# Patient Record
Sex: FEMALE | Race: OTHER
[De-identification: names, ages, dates, MRNs, and addresses within clinical notes are randomized per-mention and may not be internally consistent; named-entity substitution may affect disease eponyms.]

---

## 2017-03-20 ENCOUNTER — HOSPITAL ENCOUNTER (OUTPATIENT)
Dept: HOSPITAL 8 - STAR | Age: 47
Discharge: HOME | End: 2017-03-20
Attending: OTOLARYNGOLOGY
Payer: COMMERCIAL

## 2017-03-20 DIAGNOSIS — Z02.9: Primary | ICD-10-CM

## 2017-03-28 ENCOUNTER — HOSPITAL ENCOUNTER (OUTPATIENT)
Dept: HOSPITAL 8 - OUT | Age: 47
Discharge: HOME | End: 2017-03-28
Attending: OTOLARYNGOLOGY
Payer: COMMERCIAL

## 2017-03-28 VITALS — HEIGHT: 63 IN | WEIGHT: 156.53 LBS | BODY MASS INDEX: 27.73 KG/M2

## 2017-03-28 VITALS — DIASTOLIC BLOOD PRESSURE: 79 MMHG | SYSTOLIC BLOOD PRESSURE: 123 MMHG

## 2017-03-28 DIAGNOSIS — J34.2: Primary | ICD-10-CM

## 2017-03-28 DIAGNOSIS — J34.3: ICD-10-CM

## 2017-03-28 DIAGNOSIS — Z90.49: ICD-10-CM

## 2017-03-28 LAB
HCG UR LOT: (no result)
HCG UR OBC: (no result)

## 2017-03-28 PROCEDURE — 30520 REPAIR OF NASAL SEPTUM: CPT

## 2017-03-28 PROCEDURE — 81025 URINE PREGNANCY TEST: CPT

## 2017-03-28 PROCEDURE — 30140 RESECT INFERIOR TURBINATE: CPT

## 2017-07-18 ENCOUNTER — OFFICE VISIT (OUTPATIENT)
Dept: INTERNAL MEDICINE | Facility: MEDICAL CENTER | Age: 47
End: 2017-07-18
Payer: COMMERCIAL

## 2017-07-18 VITALS
HEIGHT: 62 IN | BODY MASS INDEX: 30 KG/M2 | HEART RATE: 65 BPM | WEIGHT: 163 LBS | SYSTOLIC BLOOD PRESSURE: 126 MMHG | TEMPERATURE: 99 F | OXYGEN SATURATION: 95 % | DIASTOLIC BLOOD PRESSURE: 78 MMHG

## 2017-07-18 DIAGNOSIS — R42 DIZZINESS: ICD-10-CM

## 2017-07-18 DIAGNOSIS — Z00.00 HEALTH CARE MAINTENANCE: ICD-10-CM

## 2017-07-18 DIAGNOSIS — N95.9 PREMENOPAUSAL PATIENT: ICD-10-CM

## 2017-07-18 DIAGNOSIS — J34.2 DEVIATED SEPTUM: ICD-10-CM

## 2017-07-18 PROCEDURE — 99204 OFFICE O/P NEW MOD 45 MIN: CPT | Performed by: INTERNAL MEDICINE

## 2017-07-18 NOTE — MR AVS SNAPSHOT
"        Joy Staples   2017 4:00 PM   Office Visit   MRN: [id number]    Department:  [facility] - Internal Med   Dept Phone:  [phone]    Description:  Female : 1970   Provider:  Fer Scott M.D.           Reason for Visit     Establish Care     Dizziness x    Eye Problem Foggingness    Extremity Weakness     Leg Pain Left. Sitffness    Lightheadedness     Cyst Brain 2005-Had surgery at [facility]    Nausea     Weight Gain Since got Gallbladder tooken out      Allergies as of 2017     No Known Allergies      You were diagnosed with     Health care maintenance   [509131]       Deviated septum   [258371]       Dizziness   [614540]       Premenopausal patient   [050439]         Vital Signs     Blood Pressure Pulse Temperature Height Weight Body Mass Index    126/78 mmHg 65 37.2 °C (99 °F) 1.575 m (5' 2\") 73.936 kg (163 lb) 29.81 kg/m2    Oxygen Saturation Smoking Status                95% Never Smoker           Basic Information     Date Of Birth Sex Race Ethnicity Preferred Language    1970 Female  or   Origin (Tajik,South Korean,Sammarinese,Omani, etc) English      Your appointments     2018  2:00 PM   Established Patient with Ciarra Montanez M.D.   [facility] - Internal Medicine (--)    [address] [place] [id number] [phone]           You will be receiving a confirmation call a few days before your appointment from our automated call confirmation system.              Problem List              ICD-10-CM Priority Class Noted - Resolved    Diarrhea R19.7   2016 - Present    Irregular menses N92.6   2016 - Present    Deviated septum J34.2   2016 - Present    BPPV (benign paroxysmal positional vertigo) H81.10   2016 - Present    Health care maintenance Z00.00   2016 - Present      Health Maintenance        Date Due Completion Dates    IMM DTaP/Tdap/Td Vaccine (1 - Tdap) 3/22/1990 ---    PAP " SMEAR 3/22/1992 ---    MAMMOGRAM 3/22/2011 ---    IMM INFLUENZA (1) 9/1/2017 ---            Current Immunizations     No immunizations on file.      Below and/or attached are the medications your provider expects you to take. Review all of your home medications and newly ordered medications with your provider and/or pharmacist. Follow medication instructions as directed by your provider and/or pharmacist. Please keep your medication list with you and share with your provider. Update the information when medications are discontinued, doses are changed, or new medications (including over-the-counter products) are added; and carry medication information at all times in the event of emergency situations     Allergies:  No Known Allergies          Medications  Valid as of: July 18, 2017 -  4:59 PM    Generic Name Brand Name Tablet Size Instructions for use    Ascorbic Acid (Tab) ascorbic acid 500 MG Take 500 mg by mouth every day.        Cholecalciferol (Cap) Vitamin D 2000 UNITS Take  by mouth.        Cyanocobalamin (Cap) B-12 1000 MCG Take  by mouth.        Glucosamine Sulfate (Cap) glucosamine Sulfate 500 MG Take 500 mg by mouth 3 times a day, with meals.        Meclizine HCl (Tab) ANTIVERT 25 MG Take 1 Tab by mouth 3 times a day as needed for Dizziness or Vertigo.        .                 Medicines prescribed today were sent to:     [facility] [address] [place], [place] [address] [address] [place], [place] [address] [address] [place] [id number]    Phone: [phone] Fax: [phone]    Open 24 Hours?: No      Medication refill instructions:       If your prescription bottle indicates you have medication refills left, it is not necessary to call your provider’s office. Please contact your pharmacy and they will refill your medication.    If your prescription bottle indicates you do not have any refills left, you may request refills at any time through one of the following ways: The online Directa Plus system (except Urgent Care), by calling your  provider’s office, or by asking your pharmacy to contact your provider’s office with a refill request. Medication refills are processed only during regular business hours and may not be available until the next business day. Your provider may request additional information or to have a follow-up visit with you prior to refilling your medication.   *Please Note: Medication refills are assigned a new Rx number when refilled electronically. Your pharmacy may indicate that no refills were authorized even though a new prescription for the same medication is available at the pharmacy. Please request the medicine by name with the pharmacy before contacting your provider for a refill.        Your To Do List     Future Labs/Procedures Complete By Expires    HEMOGLOBIN A1C  As directed 7/18/2018    TSH  As directed 7/18/2018    VITAMIN D,25 HYDROXY  As directed 7/18/2018         Fi.tt Access Code: [email or web address]  Expires: 8/17/2017  4:59 PM    Fi.tt  A secure, online tool to manage your health information     [employer]’s Fi.tt® is a secure, online tool that connects you to your personalized health information from the privacy of your home -- day or night - making it very easy for you to manage your healthcare. Once the activation process is completed, you can even access your medical information using the Fi.tt franco, which is available for free in the Apple Franco store or Google Play store.     Fi.tt provides the following levels of access (as shown below):   My Chart Features   [facility] Doctor [facility]  Urgent  Care [facility]  Primary Care  Doctor   Email your healthcare team securely and privately 24/7 X X X    Manage appointments: schedule your next appointment; view details of past/upcoming appointments X      Request prescription refills. X      View recent personal medical records, including lab and immunizations X X X X   View health record, including health history,  allergies, medications X X X X   Read reports about your outpatient visits, procedures, consult and ER notes X X X X   See your discharge summary, which is a recap of your hospital and/or ER visit that includes your diagnosis, lab results, and care plan. X X       How to register for [employer]:  1. Go to  [email or web address].  2. Click on the Sign Up Now box, which takes you to the New Member Sign Up page. You will need to provide the following information:  a. Enter your [employer] Access Code exactly as it appears at the top of this page. (You will not need to use this code after you’ve completed the sign-up process. If you do not sign up before the expiration date, you must request a new code.)   b. Enter your date of birth.   c. Enter your home email address.   d. Click Submit, and follow the next screen’s instructions.  3. Create a [employer] ID. This will be your [employer] login ID and cannot be changed, so think of one that is secure and easy to remember.  4. Create a [employer] password. You can change your password at any time.  5. Enter your Password Reset Question and Answer. This can be used at a later time if you forget your password.   6. Enter your e-mail address. This allows you to receive e-mail notifications when new information is available in [employer].  7. Click Sign Up. You can now view your health information.    For assistance activating your [employer] account, call [phone]

## 2017-07-18 NOTE — PROGRESS NOTES
Joy Staples is a 47 y.o. female who is here to Establish care and is new to the clinic.     CC: Dizziness    HPI:    Dizziness: She says when she is washing dishes or cooking for her family, she feels dissociated from the rest of her body. She doesn't have vertigo but it is more off balance. No tinnitus, fever, chills, sinu infections, allergies. She also feels like she doesn't have any energy. Has been ongoing for years. Since . She has seen a ENT and she had a deviated septum. She still feels dizzy. She denied any syncopal episodes. It is not associated with movement or any changes in position. She has seen a Neurosurgeon and was told it is not associated with the surgery. She also had multiple MRIs. She did have Vestibular rehab once but her insurance didn't cover it. She has been seen by a Neurologist, Neurosurgeon, ENT, and underwent some sort of PT as well which she says was not helpful and there was a physician which mentioned that patient may have a component of anxiety. She doesn't feel she has depression, doesn't complain of feeling down or losing interest in activities she enjoys. Also complains of foggy vision. She was reading online about multiple sclerosis and says her symptoms fit with it, but she has had MRI done in the past and has been evaluated by Neurology.       She also had Body sculpting done on Tuesday so she is complaining of abdominal pain. She is premenopausal so is complaining of hot flashes but otherwise no other symptoms.      Mammogram done earlier this year and it was normal.  PAP smear was done this past year and it was normal.     Other past medical history include: None  Past Surgical hx: 2 c-sections, cholecystctomy, colloid cyst in , deviated septum repair.  Fam Hx: One brother  from DM and the other from brain cancer at the age of 38.   Social Hx: Never smoker, 1-2 drinks every weekend, denied any other drug use.  with 2 kids. Works for a group  of [employer] (), .         No problem-specific assessment & plan notes found for this encounter.      She  has a past medical history of Colloid cyst of brain (CMS-HCC).    Patient Active Problem List    Diagnosis Date Noted   • Diarrhea 07/29/2016   • Irregular menses 07/29/2016   • Deviated septum 07/29/2016   • BPPV (benign paroxysmal positional vertigo) 07/29/2016   • Health care maintenance 07/29/2016       Allergies:Review of patient's allergies indicates no known allergies.    Current Outpatient Prescriptions   Medication Sig Dispense Refill   • glucosamine Sulfate 500 MG Cap Take 500 mg by mouth 3 times a day, with meals.     • Cyanocobalamin (B-12) 1000 MCG Cap Take  by mouth.     • Cholecalciferol (VITAMIN D) 2000 UNITS Cap Take  by mouth.     • ascorbic acid (ASCORBIC ACID) 500 MG Tab Take 500 mg by mouth every day.     • meclizine (ANTIVERT) 25 MG Tab Take 1 Tab by mouth 3 times a day as needed for Dizziness or Vertigo. 30 Tab 0     No current facility-administered medications for this visit.       Social History   Substance Use Topics   • Smoking status: Never Smoker    • Smokeless tobacco: Never Used   • Alcohol Use: 0.6 oz/week     1 Shots of liquor per week       Family History   Problem Relation Age of Onset   • Heart Disease Father    • Diabetes Brother        Review of Systems:   Constitutional ROS: No unexpected change in weight, No weakness, No unexplained fevers, sweats, or chills, Positive for fatigue.  Eye ROS: No recent significant change in vision, No eye pain, redness, discharge  Ear ROS: No ear pain, No drainage, No tinnitus or vertigo, No recent change in hearing  Mouth/Throat ROS: No sore throat  Neck ROS: No lumps or masses  Pulmonary ROS: No dyspnea on exertion, No wheezing, No shortness of breath, No recent change in breathing  Cardiovascular ROS: No exercise intolerance, No chest pain, No palpitations  Gastrointestinal ROS: No abdominal pain, No  "change in bowel habits, No significant change in appetite, No nausea, vomiting, diarrhea, or constipation  Musculoskeletal/Extremities ROS: No peripheral edema  Hematologic/Lymphatic ROS: No weight loss  Skin/Integumentary ROS: color, texture and temperature normal  Neurologic ROS: Positive for having a cyst in the brain s/p craniotomy.   Psychiatric ROS: No depression, No anxiety    Physical Exam:  Blood pressure 126/78, pulse 65, temperature 37.2 °C (99 °F), height 1.575 m (5' 2\"), weight 73.936 kg (163 lb), SpO2 95 %. Body mass index is 29.81 kg/(m^2).    General Appearance: healthy, alert, no distress, cooperative  Skin: Skin color, texture, turgor normal. No rashes or lesions.  Head: Normocephalic. No masses appreciated.   Eyes: conjunctivae/corneas clear. PERRLA.  Ears: External ears normal.  Nose/Sinuses: Nares normal.  Oropharynx: Lips, mucosa, and tongue normal.   Neck: Neck supple. No adenopathy. Thyroid symmetric, normal size, and without nodularity.  Lungs: Percussion normal. Lungs clear to auscultation bilaterally.  Heart: RRR without murmur, gallop, or rubs.  Abdomen: Soft, non-tender. BS normal.  Musculoskeletal: Extremities: Upper and lower extremities appear normal. No deformities, edema.   Peripheral Pulses: Pulses: radial=4/4  Neurologic: Cranial nerves intact.   Psychiatric: Mood appears normal.     Assessment/Plan:     Problem List Items Addressed This Visit     Deviated septum    [facility] care maintenance    Relevant Orders    VITAMIN D,25 HYDROXY    HEMOGLOBIN A1C    TSH      Other Visit Diagnoses     Dizziness         Premenopausal patient             For the dizziness, patient has gone extensive workup. May consider an SSRI for this patient as she does have fatigue and some symptoms which may point towards ABI. Can re-assess patient but for now, she doesn't want to pursue anything further and has been a problem ongoing since 2005.  Recommended to talk to Ob/Gyn regarding transdermal Estrogen " as the Estroven is not working for her.       Followup: No Follow-up on file.

## 2017-08-04 LAB
25(OH)D3+25(OH)D2 SERPL-MCNC: 41.1 NG/ML (ref 30–100)
HBA1C MFR BLD: 5.6 % (ref 4.8–5.6)
TSH SERPL DL<=0.005 MIU/L-ACNC: 1.24 UIU/ML (ref 0.45–4.5)

## 2017-09-21 ENCOUNTER — OFFICE VISIT (OUTPATIENT)
Dept: INTERNAL MEDICINE | Facility: MEDICAL CENTER | Age: 47
End: 2017-09-21
Payer: COMMERCIAL

## 2017-09-21 VITALS
HEIGHT: 62 IN | DIASTOLIC BLOOD PRESSURE: 55 MMHG | WEIGHT: 160 LBS | SYSTOLIC BLOOD PRESSURE: 87 MMHG | TEMPERATURE: 99.6 F | HEART RATE: 68 BPM | BODY MASS INDEX: 29.44 KG/M2 | OXYGEN SATURATION: 96 %

## 2017-09-21 DIAGNOSIS — F41.1 GENERALIZED ANXIETY DISORDER: ICD-10-CM

## 2017-09-21 DIAGNOSIS — H81.10 BPPV (BENIGN PAROXYSMAL POSITIONAL VERTIGO), UNSPECIFIED LATERALITY: ICD-10-CM

## 2017-09-21 DIAGNOSIS — Z00.00 HEALTH CARE MAINTENANCE: ICD-10-CM

## 2017-09-21 PROCEDURE — 99214 OFFICE O/P EST MOD 30 MIN: CPT | Performed by: INTERNAL MEDICINE

## 2017-09-21 RX ORDER — MECLIZINE HYDROCHLORIDE 25 MG/1
25 TABLET ORAL 2 TIMES DAILY PRN
Qty: 30 TAB | Refills: 0 | Status: SHIPPED | OUTPATIENT
Start: 2017-09-21 | End: 2018-06-26 | Stop reason: SDUPTHER

## 2017-09-21 RX ORDER — SERTRALINE HYDROCHLORIDE 25 MG/1
25 TABLET, FILM COATED ORAL DAILY
Qty: 30 TAB | Refills: 3 | Status: SHIPPED | OUTPATIENT
Start: 2017-09-21 | End: 2018-06-26

## 2017-09-21 ASSESSMENT — PATIENT HEALTH QUESTIONNAIRE - PHQ9
SUM OF ALL RESPONSES TO PHQ QUESTIONS 1-9: 11
CLINICAL INTERPRETATION OF PHQ2 SCORE: 3
5. POOR APPETITE OR OVEREATING: 0 - NOT AT ALL

## 2017-09-21 NOTE — PROGRESS NOTES
"        Joy Staples is a 47 y.o. female who is here for routine follow up.    CC: Dizziness, fatigue, headaches    HPI:    Joy comes to the clinic today with complaints of continued dizziness, headaches, and fatigue. She also reports increased anxiety. She reports continues worrying, trouble relaxing, being restless, and thinking that something bad might happen. She says this happens on a daily basis and she is worried something is \"really wrong with my body.\" These thoughts and perceptions have made it somewhat difficult for her to function.    ABI 7 done today showed a score of 18. With the other symptoms and ABI 7, advised patient that it may be reasonable to start a medication for the Anxiety and this has been ongoing for years.     In regards to her dizziness, she had a MRI done back in 2009 and says she had one in between as well at [facility]. She has a history of having a colloid cyst in the left frontal region. This was resected and she had follow up imaging for that which showed postop changes with surrounding gliosis in the left posterior-superior frontal lobe. There were no other changes noted at that time.    Patient declined the influenza vaccine.     No problem-specific Assessment & Plan notes found for this encounter.      She  has a past medical history of Colloid cyst of brain (CMS-HCC).    Patient Active Problem List    Diagnosis Date Noted   • Diarrhea 07/29/2016   • Irregular menses 07/29/2016   • Deviated septum 07/29/2016   • BPPV (benign paroxysmal positional vertigo) 07/29/2016   • Health care maintenance 07/29/2016       Allergies:Review of patient's allergies indicates no known allergies.    Current Outpatient Prescriptions   Medication Sig Dispense Refill   • meclizine (ANTIVERT) 25 MG Tab Take 1 Tab by mouth 2 times a day as needed for Dizziness or Vertigo. 30 Tab 0   • sertraline (ZOLOFT) 25 MG tablet Take 1 Tab by mouth every day. 30 Tab 3   • glucosamine Sulfate 500 MG Cap " "Take 500 mg by mouth 3 times a day, with meals.     • Cyanocobalamin (B-12) 1000 MCG Cap Take  by mouth.     • Cholecalciferol (VITAMIN D) 2000 UNITS Cap Take  by mouth.     • ascorbic acid (ASCORBIC ACID) 500 MG Tab Take 500 mg by mouth every day.       No current facility-administered medications for this visit.        Social History   Substance Use Topics   • Smoking status: Never Smoker   • Smokeless tobacco: Never Used   • Alcohol use 0.6 oz/week     1 Shots of liquor per week       Family History   Problem Relation Age of Onset   • Heart Disease Father    • Diabetes Brother        Review of Systems:     Pertinent positives as stated in HPI, all others reviewed as negative.    Physical Exam:  Blood pressure (!) 87/55, pulse 68, temperature 37.6 °C (99.6 °F), height 1.575 m (5' 2\"), weight 72.6 kg (160 lb), SpO2 96 %. Body mass index is 29.26 kg/m².    General Appearance: healthy, alert, no distress, cooperative  Skin: Skin color, texture, turgor normal. No rashes or lesions.  Head: Normocephalic. No masses appreciated.   Lungs: Percussion normal. Lungs clear to auscultation bilaterally.  Heart: RRR without murmur, gallop, or rubs.  Abdomen: Soft, non-tender. BS normal.   Musculoskeletal: Extremities: Upper and lower extremities appear normal. No deformities, edema.   Peripheral Pulses: Pulses: radial=4/4, dorsalis pedis=4/4  Psychiatric: Mood appears somewhat depressed.     Assessment/Plan:     1. BPPV (benign paroxysmal positional vertigo), unspecified laterality  - Unclear etiology. Previous symptoms indicative of BPPV. Says she has not tried Meclizine.  - Will review imaging from [place]. Given a script for Meclizine.   - meclizine (ANTIVERT) 25 MG Tab; Take 1 Tab by mouth 2 times a day as needed for Dizziness or Vertigo.  Dispense: 30 Tab; Refill: 0    2. Generalized anxiety disorder  - ABI 7 of 18.  - Will start patient on Zoloft. See how patient tolerates the medication. May increase dose as well if " not working well.   - sertraline (ZOLOFT) 25 MG tablet; Take 1 Tab by mouth every day.  Dispense: 30 Tab; Refill: 3    3. Health care maintenance  - Declined flu vaccine.  - Mammogram up to date.       Followup: Return in about 4 weeks (around 10/19/2017), or if symptoms worsen or fail to improve.

## 2017-12-11 ENCOUNTER — OFFICE VISIT (OUTPATIENT)
Dept: INTERNAL MEDICINE | Facility: MEDICAL CENTER | Age: 47
End: 2017-12-11
Payer: COMMERCIAL

## 2017-12-11 VITALS
BODY MASS INDEX: 30 KG/M2 | SYSTOLIC BLOOD PRESSURE: 103 MMHG | TEMPERATURE: 98.8 F | HEIGHT: 62 IN | WEIGHT: 163 LBS | DIASTOLIC BLOOD PRESSURE: 63 MMHG | HEART RATE: 64 BPM | OXYGEN SATURATION: 90 %

## 2017-12-11 DIAGNOSIS — F41.9 ANXIETY: ICD-10-CM

## 2017-12-11 DIAGNOSIS — K59.00 CONSTIPATION, UNSPECIFIED CONSTIPATION TYPE: ICD-10-CM

## 2017-12-11 DIAGNOSIS — H53.8 BLURRED VISION: ICD-10-CM

## 2017-12-11 DIAGNOSIS — H81.10 BENIGN PAROXYSMAL POSITIONAL VERTIGO, UNSPECIFIED LATERALITY: ICD-10-CM

## 2017-12-11 PROCEDURE — 99213 OFFICE O/P EST LOW 20 MIN: CPT | Mod: GE | Performed by: INTERNAL MEDICINE

## 2017-12-12 PROBLEM — K59.00 CONSTIPATION: Status: ACTIVE | Noted: 2017-12-12

## 2017-12-12 PROBLEM — F41.9 ANXIETY: Status: ACTIVE | Noted: 2017-12-12

## 2017-12-12 ASSESSMENT — ENCOUNTER SYMPTOMS
BLURRED VISION: 1
STRIDOR: 0
DIARRHEA: 0
EYE DISCHARGE: 0
CHILLS: 0
FEVER: 0
NERVOUS/ANXIOUS: 1
SENSORY CHANGE: 1
SORE THROAT: 0
SHORTNESS OF BREATH: 0
EYE PAIN: 1
SPUTUM PRODUCTION: 0
CONSTIPATION: 1
ABDOMINAL PAIN: 0
FOCAL WEAKNESS: 1
COUGH: 0
DOUBLE VISION: 0
PALPITATIONS: 0

## 2017-12-12 NOTE — PROGRESS NOTES
"      Established Patient    Joy presents today with the following:    CC: Blurred vision, Dizziness and constipation    HPI:     Blurred vision  Constipation  Dizziness  Anxiety   Patient reports that she has had blurred vision for 2 months.  She states things appear foggy.   She also noticed that colorful dots in her visual fields.  She states that blurred vision doesn't affect her driving.  She also c/o eye pain described as someone is \"pinching\" her.  She also experiences this pain in her whole body.  Pain is intermittent.  No correlation with time.  Her blurred vision is constant.   She denies any double vision, eye discharge or redness.  She believes she might have MS because she has blurred vision and constipation.    She also reports that she hasn't been drinking enough water.  She reports right leg numbness constant for 2-3 years.  Denies any intermittent sensory changes with varying location and time.    She reports b/l UE weakness since the resection of her colloidal cyst in 2008.    Though, her muscle strength is 5/5 in both UE and LE.   She feels dizzy sometimes.  She was diagnosed with BPPV and referred to Vestibular therapy.  She felt it helped; However, she is unable to continue as her insurance didn't cover.   Last time, she was started on SSRI for anxiety.  She stopped taking her SSRI some period of time and she developed anxiety attack while driving.  She started taking her SSRI again and felt it is helping with her symptoms.  She states she gets anxiety about her past diagnosis of colloidal cyst in Brain which she underwent resection.  She fears that it is growing again.  Her follow up MRI in 2006, 2007 and 2009 showed no evidence of recurrent or residual cyst or neoplasm.  There is no objective neurological finding on exam.    She also states she gets very anxious waiting in the room while discussing with my preceptor.  Discussed about seeing counselor.  She politely declined.  She states she " "will try yoga.            Patient Active Problem List    Diagnosis Date Noted   • Diarrhea 07/29/2016   • Irregular menses 07/29/2016   • Deviated septum 07/29/2016   • BPPV (benign paroxysmal positional vertigo) 07/29/2016   • Health care maintenance 07/29/2016       Current Outpatient Prescriptions   Medication Sig Dispense Refill   • meclizine (ANTIVERT) 25 MG Tab Take 1 Tab by mouth 2 times a day as needed for Dizziness or Vertigo. 30 Tab 0   • sertraline (ZOLOFT) 25 MG tablet Take 1 Tab by mouth every day. 30 Tab 3   • glucosamine Sulfate 500 MG Cap Take 500 mg by mouth 3 times a day, with meals.     • Cyanocobalamin (B-12) 1000 MCG Cap Take  by mouth.     • Cholecalciferol (VITAMIN D) 2000 UNITS Cap Take  by mouth.     • ascorbic acid (ASCORBIC ACID) 500 MG Tab Take 500 mg by mouth every day.       No current facility-administered medications for this visit.        ROS: As per HPI. Additional pertinent symptoms as noted below.      Review of Systems   Constitutional: Negative for chills and fever.   HENT: Negative for sore throat.    Eyes: Positive for blurred vision and pain. Negative for double vision and discharge.   Respiratory: Negative for cough, sputum production, shortness of breath and stridor.    Cardiovascular: Negative for chest pain and palpitations.   Gastrointestinal: Positive for constipation. Negative for abdominal pain and diarrhea.   Genitourinary: Negative for dysuria and urgency.   Musculoskeletal: Negative for joint pain.        Whole body pain described as \"pinching pain\" which is intermittent.    Skin: Negative for itching and rash.   Neurological: Positive for sensory change and focal weakness.   Psychiatric/Behavioral: The patient is nervous/anxious.        /63   Pulse 64   Temp 37.1 °C (98.8 °F)   Ht 1.575 m (5' 2\")   Wt 73.9 kg (163 lb)   SpO2 90%   BMI 29.81 kg/m²     Physical Exam   Constitutional:  oriented to person, place, and time. No distress.   Eyes: Pupils are " equal, round, and reactive to light.  EOMI.  Conjunctiva non erythematous.  No discharge.  No nystagmus. No scleral icterus.  No peripheral visual field deficit.    Neck: Neck supple..   Cardiovascular: Normal rate, regular rhythm and normal heart sounds.  Exam reveals no gallop and no friction rub.  No murmur heard.  Pulmonary/Chest: Breath sounds normal. Chest wall is not dull to percussion.   Musculoskeletal:   UE and LE strength: 5/5 equal b/l.   Sensation intact.   Abdomen:  Soft, BS+, Non tender, non distended  Neurological: alert and oriented to person, place, and time.  Gait normal.  Negative Romberg test.  No focal neurological deficit.  CN 3-12 grossly intact  Skin: No cyanosis. Nails show no clubbing.      Assessment and Plan    1. Benign paroxysmal positional vertigo, unspecified laterality  - BPPV exercise handout was given  - Meclizine prn     2. Blurred vision  - No sign of orbital cellulitis  - Refer to ophthalmology for dilated eye exam and further evaluation.   - No sign of Myasthenia gravis as there is no worsening blurred vision at the end of day or double vision.   - REFERRAL TO OPHTHALMOLOGY    3. Anxiety  - Anxious and fears that Brain colloidal cyst is growing.   Fears that she has MS.   - Her follow up MRI after resection  in 2006, 2007 and 2009 showed no evidence of recurrent or residual cyst or neoplasm.  - - There is no objective neurological finding on exam that warrants MRI at this time.   - Although she fears about MS and c/o constant RLE nubmness for 2-3 yrs, there is no neurological deficit that disseminated in time and space   - Will follow closely.  If she develops any sign of neurological deficit, will do work up  - Patient would benefit from seeing her neurologist.   - Meantime c/w SSRI.  Declined to see counselor for anxiety  - Patient would like to try yoga and relaxation method  - Advised about Mindful meditation.     4. Constipation  - Increase fluid intake, vegetable and  fruit in her diet  - May try prune juice.  If no relief, try OTC Miralax   - CTM       Followup: Return in about 6 months (around 6/11/2018).      Signed by: Ciarra Montanez M.D.

## 2018-06-26 ENCOUNTER — OFFICE VISIT (OUTPATIENT)
Dept: INTERNAL MEDICINE | Facility: MEDICAL CENTER | Age: 48
End: 2018-06-26
Payer: COMMERCIAL

## 2018-06-26 VITALS
HEART RATE: 65 BPM | SYSTOLIC BLOOD PRESSURE: 126 MMHG | OXYGEN SATURATION: 91 % | HEIGHT: 62 IN | BODY MASS INDEX: 30.69 KG/M2 | WEIGHT: 166.8 LBS | TEMPERATURE: 99.5 F | DIASTOLIC BLOOD PRESSURE: 70 MMHG

## 2018-06-26 DIAGNOSIS — H81.10 BPPV (BENIGN PAROXYSMAL POSITIONAL VERTIGO), UNSPECIFIED LATERALITY: ICD-10-CM

## 2018-06-26 DIAGNOSIS — E66.9 OBESITY (BMI 30-39.9): ICD-10-CM

## 2018-06-26 DIAGNOSIS — H53.8 BLURRY VISION: Primary | ICD-10-CM

## 2018-06-26 DIAGNOSIS — F41.9 ANXIETY: ICD-10-CM

## 2018-06-26 PROCEDURE — 99214 OFFICE O/P EST MOD 30 MIN: CPT | Mod: GC | Performed by: INTERNAL MEDICINE

## 2018-06-26 RX ORDER — MECLIZINE HYDROCHLORIDE 25 MG/1
25 TABLET ORAL 2 TIMES DAILY PRN
Qty: 30 TAB | Refills: 1 | Status: SHIPPED | OUTPATIENT
Start: 2018-06-26 | End: 2018-12-17

## 2018-06-26 ASSESSMENT — ENCOUNTER SYMPTOMS
PHOTOPHOBIA: 0
TREMORS: 0
SHORTNESS OF BREATH: 0
DIZZINESS: 1
FEVER: 0
COUGH: 0
WHEEZING: 0
TINGLING: 0
DEPRESSION: 0
VOMITING: 0
ABDOMINAL PAIN: 0
CHILLS: 0
WEAKNESS: 0
DIARRHEA: 0
NAUSEA: 0
MYALGIAS: 0
SORE THROAT: 0
FALLS: 0
DOUBLE VISION: 0
PALPITATIONS: 0
CONSTIPATION: 0
SEIZURES: 0
BLURRED VISION: 1
FOCAL WEAKNESS: 0
HEADACHES: 0

## 2018-06-26 ASSESSMENT — LIFESTYLE VARIABLES: SUBSTANCE_ABUSE: 0

## 2018-06-26 NOTE — PROGRESS NOTES
Established Patient    Joy presents today with the following:    CC:   Blurry vision      HPI:   48-year-old female with a past medical history of anxiety, vertigo, colloid cyst status post resection in 2005 who presents for a follow-up for her blurry vision. Patient has noted blurry vision and dizziness since her cyst resection. Patient has had multiple MRIs since her resection which have shown no acute process. Patient states that she has had blurry vision and feelings that stationary objects are moving when she stares at them for an extended period of time. She states that these symptoms are progressively increasing in frequency over the last 4-6 months. She also states that when she stares at patterns for extended periods of time, they start to move. She denies any associated headaches, double vision, muscle weakness. Patient states that these symptoms are not associated with any particular time of day. She does note intermittent paresthesia of her right leg which has been present for many years. Patient has seen a neurologist previously but has not followed up recently (greater than 3-4 years). Patient was recently seen by ophthalmologist in May 2017 who noted no acute abnormalities, as per the patient. She also notes occasional episodes of increased anxiety where she notices palpitations, elevated heart rate and tachypnea. These episodes resolve with rest and deep breathing. Patient was previously on Zoloft for anxiety but stopped taking it as she did not feel the medication helped.      Patient Active Problem List    Diagnosis Date Noted   • Obesity (BMI 30-39.9) 06/26/2018   • Anxiety 12/12/2017   • Constipation 12/12/2017   • Diarrhea 07/29/2016   • Irregular menses 07/29/2016   • Deviated septum 07/29/2016   • BPPV (benign paroxysmal positional vertigo) 07/29/2016   • Health care maintenance 07/29/2016       Current Outpatient Prescriptions   Medication Sig Dispense Refill   • meclizine (ANTIVERT) 25 MG  "Tab Take 1 Tab by mouth 2 times a day as needed for Dizziness or Vertigo. 30 Tab 1   • glucosamine Sulfate 500 MG Cap Take 500 mg by mouth 3 times a day, with meals.     • Cyanocobalamin (B-12) 1000 MCG Cap Take  by mouth.     • Cholecalciferol (VITAMIN D) 2000 UNITS Cap Take  by mouth.     • ascorbic acid (ASCORBIC ACID) 500 MG Tab Take 500 mg by mouth every day.       No current facility-administered medications for this visit.          ROS: As per HPI. Additional pertinent symptoms as noted below.    Review of Systems   Constitutional: Negative for chills and fever.   HENT: Negative for congestion, hearing loss and sore throat.    Eyes: Positive for blurred vision. Negative for double vision and photophobia.   Respiratory: Negative for cough, shortness of breath and wheezing.    Cardiovascular: Negative for chest pain, palpitations and leg swelling.   Gastrointestinal: Negative for abdominal pain, constipation, diarrhea, nausea and vomiting.   Genitourinary: Negative for dysuria and urgency.   Musculoskeletal: Negative for falls and myalgias.   Skin: Negative for itching and rash.   Neurological: Positive for dizziness. Negative for tingling, tremors, focal weakness, seizures, weakness and headaches.   Psychiatric/Behavioral: Negative for depression and substance abuse.       Physical Exam    /70   Pulse 65   Temp 37.5 °C (99.5 °F)   Ht 1.575 m (5' 2\")   Wt 75.7 kg (166 lb 12.8 oz)   SpO2 91%   BMI 30.51 kg/m²     Physical Exam   Constitutional: She is oriented to person, place, and time and well-developed, well-nourished, and in no distress.   HENT:   Head: Normocephalic and atraumatic.   Mouth/Throat: No oropharyngeal exudate.   Eyes: Conjunctivae are normal. Pupils are equal, round, and reactive to light. No scleral icterus.   Neck: Normal range of motion. Neck supple. No JVD present. No thyromegaly present.   Cardiovascular: Normal rate, regular rhythm and normal heart sounds.    Pulmonary/Chest: " Effort normal and breath sounds normal. No respiratory distress. She has no wheezes.   Abdominal: Soft. Bowel sounds are normal. She exhibits no distension. There is no tenderness. There is no rebound.   Musculoskeletal: Normal range of motion. She exhibits no edema.   Neurological: She is alert and oriented to person, place, and time. No cranial nerve deficit. Coordination normal.   Slight horizontal nystagmus noted   Skin: No rash noted. No erythema.   Psychiatric: Affect normal.         Assessment and Plan    1. Blurry vision  - Patient notes chronic history of blurry vision, visual disturbances  - Status post colloid cyst resection in 2005, uncomplicated  - Exam shows no focal defects, normal sensation, no visual abnormalities noted  - Recently seen by ophthalmology in 05/2017, no acute abnormalities noted, records pending  - No clear etiology for blurry vision, low suspicion for stroke or intracranial mass due to chronicity of symptoms, possibly psychogenic in nature  - REFERRAL TO NEUROLOGY for further evaluation and possible imaging    2. BPPV (benign paroxysmal positional vertigo), unspecified laterality  - Patient notes occasional episodes of vertigo, slightly relieved with medication  - Continue home meclizine  - meclizine (ANTIVERT) 25 MG Tab; Take 1 Tab by mouth 2 times a day as needed for Dizziness or Vertigo.  Dispense: 30 Tab; Refill: 1      3. Anxiety  - Patient has a history of anxiety, occasional panic attacks  - Previously on Zoloft but stopped taking it for months, did not feel adequate effect  - Patient was previously offered to see psychiatrist/behavioral health, patient declined  - We'll continue to monitor for resolution of symptoms  - Encourage meditation, yoga, relaxation      4. Obesity (BMI 30-39.9)  - Patient noted to have a BMI of 30.5 in clinic today  - Gained some weight due to decreased exercise due to visual symptoms, affects movement  - Patient identified as having weight  management issue.  Appropriate orders and counseling given.      Follow up: Return in about 3 months (around 9/26/2018).    Signed by: Bladimir Vargas M.D.

## 2018-12-17 ENCOUNTER — OFFICE VISIT (OUTPATIENT)
Dept: INTERNAL MEDICINE | Facility: MEDICAL CENTER | Age: 48
End: 2018-12-17
Payer: COMMERCIAL

## 2018-12-17 VITALS
HEIGHT: 62 IN | SYSTOLIC BLOOD PRESSURE: 114 MMHG | TEMPERATURE: 98.1 F | DIASTOLIC BLOOD PRESSURE: 60 MMHG | HEART RATE: 70 BPM | OXYGEN SATURATION: 94 % | BODY MASS INDEX: 31.1 KG/M2 | WEIGHT: 169 LBS

## 2018-12-17 DIAGNOSIS — E66.9 OBESITY (BMI 30-39.9): ICD-10-CM

## 2018-12-17 DIAGNOSIS — H53.2 DIPLOPIA: ICD-10-CM

## 2018-12-17 DIAGNOSIS — H53.8 BLURRY VISION: ICD-10-CM

## 2018-12-17 PROCEDURE — 99213 OFFICE O/P EST LOW 20 MIN: CPT | Mod: GE | Performed by: INTERNAL MEDICINE

## 2018-12-17 RX ORDER — ESTRADIOL 0.05 MG/D
1 PATCH TRANSDERMAL
Refills: 8 | COMMUNITY
Start: 2018-11-22 | End: 2022-05-17

## 2018-12-17 ASSESSMENT — PAIN SCALES - GENERAL: PAINLEVEL: NO PAIN

## 2018-12-17 ASSESSMENT — PATIENT HEALTH QUESTIONNAIRE - PHQ9
CLINICAL INTERPRETATION OF PHQ2 SCORE: 3
SUM OF ALL RESPONSES TO PHQ QUESTIONS 1-9: 5
5. POOR APPETITE OR OVEREATING: 0 - NOT AT ALL

## 2018-12-17 NOTE — PROGRESS NOTES
Established Patient    Joy presents today with the following:    CC:  Blurry vision    HPI:   48-year-old female with a past medical history of anxiety, vertigo, colloid cyst status post resection in 2005 who presents for a follow-up for her blurry vision.  Patient continues to complain of similar symptoms since her last visit she states that she has had blurry vision and feels that stationary objects move or become 'wavy' when she looks at them for extended periods of time.  She states that this is progressively increased in severity over the last year.  Patient states the symptoms started approximately 2005 after cyst resection.  She has had 3 negative MRIs since 2005 with the last one in 2009. She denies any headaches, muscle weakness, paresthesias.  Patient was previously given a neurology referral which she has not followed up with.  Patient is also stopped her meclizine for vertigo she states that she feels it does not help.      Patient Active Problem List    Diagnosis Date Noted   • Blurry vision 12/17/2018   • Obesity (BMI 30-39.9) 06/26/2018   • Anxiety 12/12/2017   • Constipation 12/12/2017   • Diarrhea 07/29/2016   • Irregular menses 07/29/2016   • Deviated septum 07/29/2016   • BPPV (benign paroxysmal positional vertigo) 07/29/2016   • Health care maintenance 07/29/2016       Current Outpatient Prescriptions   Medication Sig Dispense Refill   • estradiol (CLIMARA) 0.05 MG/24HR PATCH WEEKLY APPLY 1 PATCH TOPICALLY ONCE A WEEK  8   • progesterone (PROMETRIUM) 100 MG Cap Take 100 mg by mouth.  3   • glucosamine Sulfate 500 MG Cap Take 500 mg by mouth 3 times a day, with meals.     • Cyanocobalamin (B-12) 1000 MCG Cap Take  by mouth.     • Cholecalciferol (VITAMIN D) 2000 UNITS Cap Take  by mouth.     • ascorbic acid (ASCORBIC ACID) 500 MG Tab Take 500 mg by mouth every day.       No current facility-administered medications for this visit.          ROS: As per HPI. Additional pertinent symptoms as noted  "below.  ROS   Review of Systems   Constitutional: Negative for chills and fever.   HENT: Negative for congestion, hearing loss and sore throat.    Eyes: Positive for blurred vision. Negative for double vision and photophobia.   Respiratory: Negative for cough, shortness of breath and wheezing.    Cardiovascular: Negative for chest pain, palpitations and leg swelling.   Gastrointestinal: Negative for abdominal pain, constipation, diarrhea, nausea and vomiting.   Genitourinary: Negative for dysuria and urgency.   Musculoskeletal: Negative for falls and myalgias.   Skin: Negative for itching and rash.   Neurological: Positive for dizziness. Negative for tingling, tremors, focal weakness, seizures, weakness and headaches.   Psychiatric/Behavioral: Negative for depression and substance abuse.        Physical Exam    /60 (BP Location: Right arm, Patient Position: Sitting, BP Cuff Size: Adult)   Pulse 70   Temp 36.7 °C (98.1 °F) (Temporal)   Ht 1.575 m (5' 2\")   Wt 76.7 kg (169 lb)   LMP 12/14/2018   SpO2 94%  Breastfeeding? No  BMI 30.91 kg/m²     Physical Exam   Physical Exam   Constitutional: She is oriented to person, place, and time and well-developed, well-nourished, and in no distress.   HENT:   Head: Normocephalic and atraumatic.   Mouth/Throat: No oropharyngeal exudate.   Eyes: Conjunctivae are normal. Pupils are equal, round, and reactive to light. No scleral icterus.   Neck: Normal range of motion. Neck supple. No JVD present. No thyromegaly present.   Cardiovascular: Normal rate, regular rhythm and normal heart sounds.    Pulmonary/Chest: Effort normal and breath sounds normal. No respiratory distress. She has no wheezes.   Abdominal: Soft. Bowel sounds are normal. She exhibits no distension. There is no tenderness. There is no rebound.   Musculoskeletal: Normal range of motion. She exhibits no edema.   Neurological: She is alert and oriented to person, place, and time. No cranial nerve deficit. " Coordination normal. No focal muscle weakness appreciated.   Slight horizontal nystagmus noted   Skin: No rash noted. No erythema.   Psychiatric: Affect normal.         Assessment and Plan    1. Blurry vision  -Patient complains of blurry vision, visual disturbances for many years, increasing in severity  -Status post: Cyst resection in 2005  -Recently seen by ophthalmology in 05/2017, no acute abnormalities noted  -No clear etiology for symptoms, possible myelination disorder however unlikely given negative imaging in the past, possibly related to procedure in past  - REFERRAL TO NEUROOPTHAMOLOGY for further evaluation and possible imaging, appreciate recommendations      2. Obesity (BMI 30-39.9)  - BMI of 30.9 in clinic today  - Gained some weight due to decreased exercise due to visual symptoms, affects movement  - Patient identified as having weight management issue.  Appropriate orders and counseling given.       Follow up: Return in about 6 months (around 6/17/2019).      Signed by: Bladimir Vargas M.D.

## 2019-01-03 DIAGNOSIS — H81.10 BENIGN PAROXYSMAL POSITIONAL VERTIGO, UNSPECIFIED LATERALITY: ICD-10-CM

## 2019-01-03 DIAGNOSIS — H53.8 BLURRY VISION: ICD-10-CM

## 2019-07-05 ENCOUNTER — HOSPITAL ENCOUNTER (OUTPATIENT)
Dept: HOSPITAL 8 - OR | Age: 49
Discharge: HOME | End: 2019-07-05
Attending: OBSTETRICS & GYNECOLOGY
Payer: COMMERCIAL

## 2019-07-05 VITALS — BODY MASS INDEX: 29.3 KG/M2 | WEIGHT: 165.35 LBS | HEIGHT: 63 IN

## 2019-07-05 VITALS — SYSTOLIC BLOOD PRESSURE: 117 MMHG | DIASTOLIC BLOOD PRESSURE: 77 MMHG

## 2019-07-05 DIAGNOSIS — N85.8: Primary | ICD-10-CM

## 2019-07-05 DIAGNOSIS — Z91.013: ICD-10-CM

## 2019-07-05 DIAGNOSIS — Z80.8: ICD-10-CM

## 2019-07-05 DIAGNOSIS — N99.71: ICD-10-CM

## 2019-07-05 DIAGNOSIS — Z98.890: ICD-10-CM
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
## 2019-07-05 DIAGNOSIS — Z79.899: ICD-10-CM

## 2019-07-05 DIAGNOSIS — Z83.3: ICD-10-CM

## 2019-07-05 DIAGNOSIS — Z90.49: ICD-10-CM

## 2019-07-05 LAB — HCG UR SG: 1.02 (ref 1–1.03)

## 2019-07-05 PROCEDURE — 88305 TISSUE EXAM BY PATHOLOGIST: CPT

## 2019-07-05 PROCEDURE — 81025 URINE PREGNANCY TEST: CPT

## 2019-07-05 PROCEDURE — 58558 HYSTEROSCOPY BIOPSY: CPT

## 2019-12-12 ENCOUNTER — HOSPITAL ENCOUNTER (OUTPATIENT)
Dept: HOSPITAL 8 - STAR | Age: 49
Discharge: HOME | End: 2019-12-12
Attending: OBSTETRICS & GYNECOLOGY
Payer: COMMERCIAL

## 2019-12-12 DIAGNOSIS — N95.0: ICD-10-CM

## 2019-12-12 DIAGNOSIS — R10.31: ICD-10-CM

## 2019-12-12 DIAGNOSIS — Z01.818: Primary | ICD-10-CM

## 2019-12-12 LAB
ALBUMIN SERPL-MCNC: 3.6 G/DL (ref 3.4–5)
ALP SERPL-CCNC: 76 U/L (ref 45–117)
ALT SERPL-CCNC: 26 U/L (ref 12–78)
ANION GAP SERPL CALC-SCNC: 5 MMOL/L (ref 5–15)
BILIRUB SERPL-MCNC: 0.8 MG/DL (ref 0.2–1)
CALCIUM SERPL-MCNC: 8.6 MG/DL (ref 8.5–10.1)
CHLORIDE SERPL-SCNC: 107 MMOL/L (ref 98–107)
CREAT SERPL-MCNC: 0.85 MG/DL (ref 0.55–1.02)
CULTURE INDICATED?: YES
MICROSCOPIC: (no result)
PROT SERPL-MCNC: 8 G/DL (ref 6.4–8.2)

## 2019-12-12 PROCEDURE — 36415 COLL VENOUS BLD VENIPUNCTURE: CPT

## 2019-12-12 PROCEDURE — 87086 URINE CULTURE/COLONY COUNT: CPT

## 2019-12-12 PROCEDURE — 81001 URINALYSIS AUTO W/SCOPE: CPT

## 2019-12-12 PROCEDURE — 93005 ELECTROCARDIOGRAM TRACING: CPT

## 2019-12-12 PROCEDURE — 80053 COMPREHEN METABOLIC PANEL: CPT

## 2019-12-12 PROCEDURE — 84702 CHORIONIC GONADOTROPIN TEST: CPT

## 2019-12-19 ENCOUNTER — HOSPITAL ENCOUNTER (OUTPATIENT)
Dept: HOSPITAL 8 - OUT | Age: 49
Discharge: HOME | End: 2019-12-19
Attending: OBSTETRICS & GYNECOLOGY
Payer: COMMERCIAL

## 2019-12-19 VITALS — WEIGHT: 156.53 LBS | HEIGHT: 63 IN | BODY MASS INDEX: 27.73 KG/M2

## 2019-12-19 VITALS — DIASTOLIC BLOOD PRESSURE: 68 MMHG | SYSTOLIC BLOOD PRESSURE: 109 MMHG

## 2019-12-19 DIAGNOSIS — N95.0: Primary | ICD-10-CM

## 2019-12-19 DIAGNOSIS — D25.9: ICD-10-CM

## 2019-12-19 DIAGNOSIS — Z90.49: ICD-10-CM

## 2019-12-19 DIAGNOSIS — Z88.8: ICD-10-CM

## 2019-12-19 DIAGNOSIS — Z98.890: ICD-10-CM

## 2019-12-19 DIAGNOSIS — N80.0: ICD-10-CM

## 2019-12-19 LAB — HCG UR SG: 1.02 (ref 1–1.03)

## 2019-12-19 PROCEDURE — 81025 URINE PREGNANCY TEST: CPT

## 2019-12-19 PROCEDURE — 58552 LAPARO-VAG HYST INCL T/O: CPT

## 2019-12-19 PROCEDURE — 88307 TISSUE EXAM BY PATHOLOGIST: CPT

## 2020-04-01 ENCOUNTER — TELEPHONE (OUTPATIENT)
Dept: HEALTH INFORMATION MANAGEMENT | Facility: OTHER | Age: 50
End: 2020-04-01

## 2020-04-01 NOTE — TELEPHONE ENCOUNTER
1. Caller Name: Joy                        Call Back Number: [phone]  [facility] PCP or Specialty Provider: Yes, Alicia       2.  Does patient have any active symptoms of respiratory illness (fever OR cough OR shortness of breath OR sore throat)? No. White coating on mouth x 2 days. Hot flashes at night -pt is menopausal.     3.  Does patient have any comoribidities? None     4.  Has the patient traveled in the last 14 days OR had any known contact with someone who is suspected or confirmed to have COVID-19?  No.    5. Disposition: Advised to perform self care, monitor for worsening symptoms and to call back in 3 days if no improvement. She verbalized agreement and understanding.

## 2021-05-16 ENCOUNTER — ANESTHESIA (OUTPATIENT)
Dept: SURGERY | Facility: MEDICAL CENTER | Age: 51
End: 2021-05-16
Payer: MEDICAID

## 2021-05-16 ENCOUNTER — APPOINTMENT (OUTPATIENT)
Dept: RADIOLOGY | Facility: MEDICAL CENTER | Age: 51
End: 2021-05-16
Attending: EMERGENCY MEDICINE
Payer: MEDICAID

## 2021-05-16 ENCOUNTER — ANESTHESIA EVENT (OUTPATIENT)
Dept: SURGERY | Facility: MEDICAL CENTER | Age: 51
End: 2021-05-16
Payer: MEDICAID

## 2021-05-16 ENCOUNTER — OFFICE VISIT (OUTPATIENT)
Dept: URGENT CARE | Facility: CLINIC | Age: 51
End: 2021-05-16
Payer: MEDICAID

## 2021-05-16 ENCOUNTER — HOSPITAL ENCOUNTER (EMERGENCY)
Facility: MEDICAL CENTER | Age: 51
End: 2021-05-17
Attending: EMERGENCY MEDICINE | Admitting: EMERGENCY MEDICINE
Payer: MEDICAID

## 2021-05-16 VITALS
SYSTOLIC BLOOD PRESSURE: 108 MMHG | RESPIRATION RATE: 16 BRPM | HEIGHT: 63 IN | BODY MASS INDEX: 28.7 KG/M2 | HEART RATE: 74 BPM | OXYGEN SATURATION: 95 % | DIASTOLIC BLOOD PRESSURE: 58 MMHG | TEMPERATURE: 97.7 F | WEIGHT: 162 LBS

## 2021-05-16 VITALS
HEART RATE: 73 BPM | RESPIRATION RATE: 16 BRPM | WEIGHT: 162.92 LBS | HEIGHT: 63 IN | SYSTOLIC BLOOD PRESSURE: 96 MMHG | TEMPERATURE: 97 F | BODY MASS INDEX: 28.87 KG/M2 | DIASTOLIC BLOOD PRESSURE: 58 MMHG | OXYGEN SATURATION: 94 %

## 2021-05-16 DIAGNOSIS — K35.80 ACUTE APPENDICITIS, UNSPECIFIED ACUTE APPENDICITIS TYPE: ICD-10-CM

## 2021-05-16 DIAGNOSIS — R10.31 RLQ ABDOMINAL PAIN: ICD-10-CM

## 2021-05-16 DIAGNOSIS — R63.0 ANOREXIA: ICD-10-CM

## 2021-05-16 DIAGNOSIS — R11.0 NAUSEA: ICD-10-CM

## 2021-05-16 DIAGNOSIS — G89.18 POST-OP PAIN: ICD-10-CM

## 2021-05-16 LAB
ALBUMIN SERPL BCP-MCNC: 3.9 G/DL (ref 3.2–4.9)
ALBUMIN/GLOB SERPL: 1.1 G/DL
ALP SERPL-CCNC: 82 U/L (ref 30–99)
ALT SERPL-CCNC: 14 U/L (ref 2–50)
ANION GAP SERPL CALC-SCNC: 10 MMOL/L (ref 7–16)
APPEARANCE UR: CLEAR
AST SERPL-CCNC: 13 U/L (ref 12–45)
BASOPHILS # BLD AUTO: 0.2 % (ref 0–1.8)
BASOPHILS # BLD: 0.02 K/UL (ref 0–0.12)
BILIRUB SERPL-MCNC: 0.4 MG/DL (ref 0.1–1.5)
BILIRUB UR QL STRIP.AUTO: NEGATIVE
BUN SERPL-MCNC: 13 MG/DL (ref 8–22)
CALCIUM SERPL-MCNC: 8.6 MG/DL (ref 8.4–10.2)
CHLORIDE SERPL-SCNC: 104 MMOL/L (ref 96–112)
CO2 SERPL-SCNC: 26 MMOL/L (ref 20–33)
COLOR UR: YELLOW
CREAT SERPL-MCNC: 0.61 MG/DL (ref 0.5–1.4)
EKG IMPRESSION: NORMAL
EOSINOPHIL # BLD AUTO: 0.11 K/UL (ref 0–0.51)
EOSINOPHIL NFR BLD: 0.9 % (ref 0–6.9)
ERYTHROCYTE [DISTWIDTH] IN BLOOD BY AUTOMATED COUNT: 39.3 FL (ref 35.9–50)
GLOBULIN SER CALC-MCNC: 3.4 G/DL (ref 1.9–3.5)
GLUCOSE SERPL-MCNC: 98 MG/DL (ref 65–99)
GLUCOSE UR STRIP.AUTO-MCNC: NEGATIVE MG/DL
HCT VFR BLD AUTO: 39.8 % (ref 37–47)
HGB BLD-MCNC: 12.9 G/DL (ref 12–16)
IMM GRANULOCYTES # BLD AUTO: 0.05 K/UL (ref 0–0.11)
IMM GRANULOCYTES NFR BLD AUTO: 0.4 % (ref 0–0.9)
KETONES UR STRIP.AUTO-MCNC: NEGATIVE MG/DL
LEUKOCYTE ESTERASE UR QL STRIP.AUTO: NEGATIVE
LIPASE SERPL-CCNC: 25 U/L (ref 7–58)
LYMPHOCYTES # BLD AUTO: 2.56 K/UL (ref 1–4.8)
LYMPHOCYTES NFR BLD: 21.1 % (ref 22–41)
MCH RBC QN AUTO: 30.4 PG (ref 27–33)
MCHC RBC AUTO-ENTMCNC: 32.4 G/DL (ref 33.6–35)
MCV RBC AUTO: 93.9 FL (ref 81.4–97.8)
MICRO URNS: NORMAL
MONOCYTES # BLD AUTO: 0.9 K/UL (ref 0–0.85)
MONOCYTES NFR BLD AUTO: 7.4 % (ref 0–13.4)
NEUTROPHILS # BLD AUTO: 8.47 K/UL (ref 2–7.15)
NEUTROPHILS NFR BLD: 70 % (ref 44–72)
NITRITE UR QL STRIP.AUTO: NEGATIVE
NRBC # BLD AUTO: 0 K/UL
NRBC BLD-RTO: 0 /100 WBC
PATHOLOGY CONSULT NOTE: NORMAL
PH UR STRIP.AUTO: 8 [PH] (ref 5–8)
PLATELET # BLD AUTO: 324 K/UL (ref 164–446)
PMV BLD AUTO: 9.8 FL (ref 9–12.9)
POTASSIUM SERPL-SCNC: 4.3 MMOL/L (ref 3.6–5.5)
PROT SERPL-MCNC: 7.3 G/DL (ref 6–8.2)
PROT UR QL STRIP: NEGATIVE MG/DL
RBC # BLD AUTO: 4.24 M/UL (ref 4.2–5.4)
RBC UR QL AUTO: NEGATIVE
SARS-COV+SARS-COV-2 AG RESP QL IA.RAPID: NOTDETECTED
SODIUM SERPL-SCNC: 140 MMOL/L (ref 135–145)
SP GR UR STRIP.AUTO: 1.01
SPECIMEN SOURCE: NORMAL
WBC # BLD AUTO: 12.1 K/UL (ref 4.8–10.8)

## 2021-05-16 PROCEDURE — 501399 HCHG SPECIMAN BAG, ENDO CATC: Performed by: SURGERY

## 2021-05-16 PROCEDURE — 74177 CT ABD & PELVIS W/CONTRAST: CPT

## 2021-05-16 PROCEDURE — 700101 HCHG RX REV CODE 250: Performed by: EMERGENCY MEDICINE

## 2021-05-16 PROCEDURE — 36415 COLL VENOUS BLD VENIPUNCTURE: CPT

## 2021-05-16 PROCEDURE — 87426 SARSCOV CORONAVIRUS AG IA: CPT

## 2021-05-16 PROCEDURE — 160041 HCHG SURGERY MINUTES - EA ADDL 1 MIN LEVEL 4: Performed by: SURGERY

## 2021-05-16 PROCEDURE — 160048 HCHG OR STATISTICAL LEVEL 1-5: Performed by: SURGERY

## 2021-05-16 PROCEDURE — U0005 INFEC AGEN DETEC AMPLI PROBE: HCPCS

## 2021-05-16 PROCEDURE — 700111 HCHG RX REV CODE 636 W/ 250 OVERRIDE (IP): Performed by: ANESTHESIOLOGY

## 2021-05-16 PROCEDURE — 160046 HCHG PACU - 1ST 60 MINS PHASE II: Performed by: SURGERY

## 2021-05-16 PROCEDURE — 700111 HCHG RX REV CODE 636 W/ 250 OVERRIDE (IP): Performed by: EMERGENCY MEDICINE

## 2021-05-16 PROCEDURE — 81003 URINALYSIS AUTO W/O SCOPE: CPT

## 2021-05-16 PROCEDURE — 99214 OFFICE O/P EST MOD 30 MIN: CPT | Performed by: PHYSICIAN ASSISTANT

## 2021-05-16 PROCEDURE — 160025 RECOVERY II MINUTES (STATS): Performed by: SURGERY

## 2021-05-16 PROCEDURE — 700101 HCHG RX REV CODE 250: Performed by: SURGERY

## 2021-05-16 PROCEDURE — 700117 HCHG RX CONTRAST REV CODE 255: Performed by: EMERGENCY MEDICINE

## 2021-05-16 PROCEDURE — 93005 ELECTROCARDIOGRAM TRACING: CPT | Performed by: EMERGENCY MEDICINE

## 2021-05-16 PROCEDURE — 501583 HCHG TROCAR, THRD CAN&SEAL 5X100: Performed by: SURGERY

## 2021-05-16 PROCEDURE — 96365 THER/PROPH/DIAG IV INF INIT: CPT | Mod: XU

## 2021-05-16 PROCEDURE — A9270 NON-COVERED ITEM OR SERVICE: HCPCS | Performed by: ANESTHESIOLOGY

## 2021-05-16 PROCEDURE — C9803 HOPD COVID-19 SPEC COLLECT: HCPCS | Performed by: EMERGENCY MEDICINE

## 2021-05-16 PROCEDURE — 501838 HCHG SUTURE GENERAL: Performed by: SURGERY

## 2021-05-16 PROCEDURE — 501570 HCHG TROCAR, SEPARATOR: Performed by: SURGERY

## 2021-05-16 PROCEDURE — 96375 TX/PRO/DX INJ NEW DRUG ADDON: CPT | Mod: XU

## 2021-05-16 PROCEDURE — 160029 HCHG SURGERY MINUTES - 1ST 30 MINS LEVEL 4: Performed by: SURGERY

## 2021-05-16 PROCEDURE — 83690 ASSAY OF LIPASE: CPT

## 2021-05-16 PROCEDURE — 700105 HCHG RX REV CODE 258: Performed by: ANESTHESIOLOGY

## 2021-05-16 PROCEDURE — U0003 INFECTIOUS AGENT DETECTION BY NUCLEIC ACID (DNA OR RNA); SEVERE ACUTE RESPIRATORY SYNDROME CORONAVIRUS 2 (SARS-COV-2) (CORONAVIRUS DISEASE [COVID-19]), AMPLIFIED PROBE TECHNIQUE, MAKING USE OF HIGH THROUGHPUT TECHNOLOGIES AS DESCRIBED BY CMS-2020-01-R: HCPCS

## 2021-05-16 PROCEDURE — 96368 THER/DIAG CONCURRENT INF: CPT | Mod: XU

## 2021-05-16 PROCEDURE — 500002 HCHG ADHESIVE, DERMABOND: Performed by: SURGERY

## 2021-05-16 PROCEDURE — 85025 COMPLETE CBC W/AUTO DIFF WBC: CPT

## 2021-05-16 PROCEDURE — 700102 HCHG RX REV CODE 250 W/ 637 OVERRIDE(OP): Performed by: ANESTHESIOLOGY

## 2021-05-16 PROCEDURE — 160002 HCHG RECOVERY MINUTES (STAT): Performed by: SURGERY

## 2021-05-16 PROCEDURE — 99291 CRITICAL CARE FIRST HOUR: CPT

## 2021-05-16 PROCEDURE — 501571 HCHG TROCAR, SEPARATOR 12X100: Performed by: SURGERY

## 2021-05-16 PROCEDURE — 700101 HCHG RX REV CODE 250: Performed by: ANESTHESIOLOGY

## 2021-05-16 PROCEDURE — 501450 HCHG STAPLES, ENDO MULTIFIRE: Performed by: SURGERY

## 2021-05-16 PROCEDURE — 700105 HCHG RX REV CODE 258: Performed by: EMERGENCY MEDICINE

## 2021-05-16 PROCEDURE — 88304 TISSUE EXAM BY PATHOLOGIST: CPT

## 2021-05-16 PROCEDURE — 160009 HCHG ANES TIME/MIN: Performed by: SURGERY

## 2021-05-16 PROCEDURE — 502571 HCHG PACK, LAP CHOLE: Performed by: SURGERY

## 2021-05-16 PROCEDURE — 80053 COMPREHEN METABOLIC PANEL: CPT

## 2021-05-16 PROCEDURE — 160035 HCHG PACU - 1ST 60 MINS PHASE I: Performed by: SURGERY

## 2021-05-16 PROCEDURE — 160036 HCHG PACU - EA ADDL 30 MINS PHASE I: Performed by: SURGERY

## 2021-05-16 RX ORDER — MEPERIDINE HYDROCHLORIDE 25 MG/ML
12.5 INJECTION INTRAMUSCULAR; INTRAVENOUS; SUBCUTANEOUS
Status: DISCONTINUED | OUTPATIENT
Start: 2021-05-16 | End: 2021-05-17 | Stop reason: HOSPADM

## 2021-05-16 RX ORDER — DIPHENHYDRAMINE HYDROCHLORIDE 50 MG/ML
12.5 INJECTION INTRAMUSCULAR; INTRAVENOUS
Status: DISCONTINUED | OUTPATIENT
Start: 2021-05-16 | End: 2021-05-17 | Stop reason: HOSPADM

## 2021-05-16 RX ORDER — POLYETHYLENE GLYCOL 3350 17 G/17G
17 POWDER, FOR SOLUTION ORAL
Qty: 500 G | Status: SHIPPED | OUTPATIENT
Start: 2021-05-16 | End: 2022-03-15

## 2021-05-16 RX ORDER — LABETALOL HYDROCHLORIDE 5 MG/ML
5 INJECTION, SOLUTION INTRAVENOUS
Status: DISCONTINUED | OUTPATIENT
Start: 2021-05-16 | End: 2021-05-17 | Stop reason: HOSPADM

## 2021-05-16 RX ORDER — MIDAZOLAM HYDROCHLORIDE 1 MG/ML
INJECTION INTRAMUSCULAR; INTRAVENOUS PRN
Status: DISCONTINUED | OUTPATIENT
Start: 2021-05-16 | End: 2021-05-16 | Stop reason: SURG

## 2021-05-16 RX ORDER — KETOROLAC TROMETHAMINE 30 MG/ML
INJECTION, SOLUTION INTRAMUSCULAR; INTRAVENOUS PRN
Status: DISCONTINUED | OUTPATIENT
Start: 2021-05-16 | End: 2021-05-16 | Stop reason: SURG

## 2021-05-16 RX ORDER — LIDOCAINE HYDROCHLORIDE 20 MG/ML
INJECTION, SOLUTION EPIDURAL; INFILTRATION; INTRACAUDAL; PERINEURAL PRN
Status: DISCONTINUED | OUTPATIENT
Start: 2021-05-16 | End: 2021-05-16 | Stop reason: SURG

## 2021-05-16 RX ORDER — DEXAMETHASONE SODIUM PHOSPHATE 4 MG/ML
INJECTION, SOLUTION INTRA-ARTICULAR; INTRALESIONAL; INTRAMUSCULAR; INTRAVENOUS; SOFT TISSUE PRN
Status: DISCONTINUED | OUTPATIENT
Start: 2021-05-16 | End: 2021-05-16 | Stop reason: SURG

## 2021-05-16 RX ORDER — HYDRALAZINE HYDROCHLORIDE 20 MG/ML
5 INJECTION INTRAMUSCULAR; INTRAVENOUS
Status: DISCONTINUED | OUTPATIENT
Start: 2021-05-16 | End: 2021-05-17 | Stop reason: HOSPADM

## 2021-05-16 RX ORDER — MORPHINE SULFATE 4 MG/ML
4 INJECTION, SOLUTION INTRAMUSCULAR; INTRAVENOUS
Status: DISCONTINUED | OUTPATIENT
Start: 2021-05-16 | End: 2021-05-17 | Stop reason: HOSPADM

## 2021-05-16 RX ORDER — ONDANSETRON 2 MG/ML
4 INJECTION INTRAMUSCULAR; INTRAVENOUS ONCE
Status: COMPLETED | OUTPATIENT
Start: 2021-05-16 | End: 2021-05-16

## 2021-05-16 RX ORDER — OXYCODONE HCL 5 MG/5 ML
10 SOLUTION, ORAL ORAL
Status: COMPLETED | OUTPATIENT
Start: 2021-05-16 | End: 2021-05-16

## 2021-05-16 RX ORDER — BUPIVACAINE HYDROCHLORIDE AND EPINEPHRINE 5; 5 MG/ML; UG/ML
INJECTION, SOLUTION EPIDURAL; INTRACAUDAL; PERINEURAL
Status: DISCONTINUED | OUTPATIENT
Start: 2021-05-16 | End: 2021-05-16 | Stop reason: HOSPADM

## 2021-05-16 RX ORDER — HYDROMORPHONE HYDROCHLORIDE 1 MG/ML
0.2 INJECTION, SOLUTION INTRAMUSCULAR; INTRAVENOUS; SUBCUTANEOUS
Status: DISCONTINUED | OUTPATIENT
Start: 2021-05-16 | End: 2021-05-17 | Stop reason: HOSPADM

## 2021-05-16 RX ORDER — HALOPERIDOL 5 MG/ML
1 INJECTION INTRAMUSCULAR
Status: DISCONTINUED | OUTPATIENT
Start: 2021-05-16 | End: 2021-05-17 | Stop reason: HOSPADM

## 2021-05-16 RX ORDER — IBUPROFEN 200 MG
400 TABLET ORAL EVERY 6 HOURS PRN
COMMUNITY
End: 2022-03-15

## 2021-05-16 RX ORDER — HYDROMORPHONE HYDROCHLORIDE 1 MG/ML
0.4 INJECTION, SOLUTION INTRAMUSCULAR; INTRAVENOUS; SUBCUTANEOUS
Status: DISCONTINUED | OUTPATIENT
Start: 2021-05-16 | End: 2021-05-17 | Stop reason: HOSPADM

## 2021-05-16 RX ORDER — OXYCODONE HYDROCHLORIDE AND ACETAMINOPHEN 5; 325 MG/1; MG/1
1 TABLET ORAL EVERY 4 HOURS PRN
Qty: 20 TABLET | Refills: 0 | Status: SHIPPED | OUTPATIENT
Start: 2021-05-16 | End: 2021-05-23

## 2021-05-16 RX ORDER — ONDANSETRON 2 MG/ML
INJECTION INTRAMUSCULAR; INTRAVENOUS PRN
Status: DISCONTINUED | OUTPATIENT
Start: 2021-05-16 | End: 2021-05-16 | Stop reason: SURG

## 2021-05-16 RX ORDER — HYDROMORPHONE HYDROCHLORIDE 1 MG/ML
0.5 INJECTION, SOLUTION INTRAMUSCULAR; INTRAVENOUS; SUBCUTANEOUS
Status: DISCONTINUED | OUTPATIENT
Start: 2021-05-16 | End: 2021-05-17 | Stop reason: HOSPADM

## 2021-05-16 RX ORDER — SODIUM CHLORIDE, SODIUM LACTATE, POTASSIUM CHLORIDE, CALCIUM CHLORIDE 600; 310; 30; 20 MG/100ML; MG/100ML; MG/100ML; MG/100ML
1000 INJECTION, SOLUTION INTRAVENOUS ONCE
Status: COMPLETED | OUTPATIENT
Start: 2021-05-16 | End: 2021-05-16

## 2021-05-16 RX ORDER — OXYCODONE HCL 5 MG/5 ML
5 SOLUTION, ORAL ORAL
Status: COMPLETED | OUTPATIENT
Start: 2021-05-16 | End: 2021-05-16

## 2021-05-16 RX ORDER — ROCURONIUM BROMIDE 10 MG/ML
INJECTION, SOLUTION INTRAVENOUS PRN
Status: DISCONTINUED | OUTPATIENT
Start: 2021-05-16 | End: 2021-05-16 | Stop reason: SURG

## 2021-05-16 RX ORDER — SODIUM CHLORIDE, SODIUM LACTATE, POTASSIUM CHLORIDE, CALCIUM CHLORIDE 600; 310; 30; 20 MG/100ML; MG/100ML; MG/100ML; MG/100ML
INJECTION, SOLUTION INTRAVENOUS
Status: DISCONTINUED | OUTPATIENT
Start: 2021-05-16 | End: 2021-05-16 | Stop reason: SURG

## 2021-05-16 RX ORDER — SODIUM CHLORIDE, SODIUM LACTATE, POTASSIUM CHLORIDE, CALCIUM CHLORIDE 600; 310; 30; 20 MG/100ML; MG/100ML; MG/100ML; MG/100ML
INJECTION, SOLUTION INTRAVENOUS CONTINUOUS
Status: DISCONTINUED | OUTPATIENT
Start: 2021-05-16 | End: 2021-05-17 | Stop reason: HOSPADM

## 2021-05-16 RX ADMIN — MIDAZOLAM HYDROCHLORIDE 2 MG: 1 INJECTION, SOLUTION INTRAMUSCULAR; INTRAVENOUS at 14:04

## 2021-05-16 RX ADMIN — ONDANSETRON 4 MG: 2 INJECTION INTRAMUSCULAR; INTRAVENOUS at 14:32

## 2021-05-16 RX ADMIN — CEFTRIAXONE SODIUM 1 G: 1 INJECTION, POWDER, FOR SOLUTION INTRAMUSCULAR; INTRAVENOUS at 12:55

## 2021-05-16 RX ADMIN — OXYCODONE HYDROCHLORIDE 10 MG: 5 SOLUTION ORAL at 15:38

## 2021-05-16 RX ADMIN — FENTANYL CITRATE 50 MCG: 50 INJECTION, SOLUTION INTRAMUSCULAR; INTRAVENOUS at 14:11

## 2021-05-16 RX ADMIN — IOHEXOL 100 ML: 350 INJECTION, SOLUTION INTRAVENOUS at 12:33

## 2021-05-16 RX ADMIN — ONDANSETRON 4 MG: 2 INJECTION INTRAMUSCULAR; INTRAVENOUS at 12:27

## 2021-05-16 RX ADMIN — KETOROLAC TROMETHAMINE 30 MG: 30 INJECTION, SOLUTION INTRAMUSCULAR at 14:33

## 2021-05-16 RX ADMIN — PROPOFOL 120 MG: 10 INJECTION, EMULSION INTRAVENOUS at 14:11

## 2021-05-16 RX ADMIN — SUGAMMADEX 200 MG: 100 INJECTION, SOLUTION INTRAVENOUS at 14:37

## 2021-05-16 RX ADMIN — SODIUM CHLORIDE, POTASSIUM CHLORIDE, SODIUM LACTATE AND CALCIUM CHLORIDE: 600; 310; 30; 20 INJECTION, SOLUTION INTRAVENOUS at 14:06

## 2021-05-16 RX ADMIN — ROCURONIUM BROMIDE 50 MG: 10 INJECTION, SOLUTION INTRAVENOUS at 14:12

## 2021-05-16 RX ADMIN — FENTANYL CITRATE 50 MCG: 50 INJECTION, SOLUTION INTRAMUSCULAR; INTRAVENOUS at 14:25

## 2021-05-16 RX ADMIN — DEXAMETHASONE SODIUM PHOSPHATE 8 MG: 4 INJECTION, SOLUTION INTRAMUSCULAR; INTRAVENOUS at 14:14

## 2021-05-16 RX ADMIN — LIDOCAINE HYDROCHLORIDE 60 MG: 20 INJECTION, SOLUTION EPIDURAL; INFILTRATION; INTRACAUDAL; PERINEURAL at 14:11

## 2021-05-16 RX ADMIN — METRONIDAZOLE 500 MG: 500 INJECTION, SOLUTION INTRAVENOUS at 12:54

## 2021-05-16 RX ADMIN — SODIUM CHLORIDE, POTASSIUM CHLORIDE, SODIUM LACTATE AND CALCIUM CHLORIDE 1000 ML: 600; 310; 30; 20 INJECTION, SOLUTION INTRAVENOUS at 12:26

## 2021-05-16 RX ADMIN — MORPHINE SULFATE 4 MG: 4 INJECTION INTRAVENOUS at 13:25

## 2021-05-16 ASSESSMENT — ENCOUNTER SYMPTOMS
CONSTIPATION: 1
FEVER: 0
VOMITING: 0
NAUSEA: 1

## 2021-05-16 ASSESSMENT — PAIN DESCRIPTION - DESCRIPTORS: DESCRIPTORS: ACHING

## 2021-05-16 NOTE — DISCHARGE INSTRUCTIONS
ACTIVITY: Rest and take it easy for the first 24 hours.  A responsible adult is recommended to remain with you during that time.  It is normal to feel sleepy.  We encourage you to not do anything that requires balance, judgment or coordination.    MILD FLU-LIKE SYMPTOMS ARE NORMAL. YOU MAY EXPERIENCE GENERALIZED MUSCLE ACHES, THROAT IRRITATION, HEADACHE AND/OR SOME NAUSEA.    FOR 24 HOURS DO NOT:  Drive, operate machinery or run household appliances.  Drink beer or alcoholic beverages.   Make important decisions or sign legal documents.    SPECIAL INSTRUCTIONS:   \[id number]\  DIET: To avoid nausea, slowly advance diet as tolerated, avoiding spicy or greasy foods for the first day.  Add more substantial food to your diet according to your physician's instructions.  Babies can be fed formula or breast milk as soon as they are hungry.  INCREASE FLUIDS AND FIBER TO AVOID CONSTIPATION.    SURGICAL DRESSING/BATHING: Keep incisions clean and dry    FOLLOW-UP APPOINTMENT:  A follow-up appointment should be arranged with your doctor; call to schedule.    You should CALL YOUR PHYSICIAN if you develop:  Fever greater than 101 degrees F.  Pain not relieved by medication, or persistent nausea or vomiting.  Excessive bleeding (blood soaking through dressing) or unexpected drainage from the wound.  Extreme redness or swelling around the incision site, drainage of pus or foul smelling drainage.  Inability to urinate or empty your bladder within 8 hours.  Problems with breathing or chest pain.    You should call 911 if you develop problems with breathing or chest pain.  If you are unable to contact your doctor or surgical center, you should go to the nearest emergency room or urgent care center.  Physician's telephone #: [phone]    If any questions arise, call your doctor.  If your doctor is not available, please feel free to call the Surgical Center at [phone]. The Contact Center is open Monday through Friday 7AM to 5PM  and may speak to a nurse at [phone], or toll free at [phone].     A registered nurse may call you a few days after your surgery to see how you are doing after your procedure.    MEDICATIONS: Resume taking daily medication.  Take prescribed pain medication with food.  If no medication is prescribed, you may take non-aspirin pain medication if needed.  PAIN MEDICATION CAN BE VERY CONSTIPATING.  Take a stool softener or laxative such as senokot, pericolace, or milk of magnesia if needed.    Prescription given for n/a.  Last pain medication given at 3:40 next due at 8 pm if needed.    If your physician has prescribed pain medication that includes Acetaminophen (Tylenol), do not take additional Acetaminophen (Tylenol) while taking the prescribed medication.    Depression / Suicide Risk    As you are discharged from this [facility] facility, it is important to learn how to keep safe from harming yourself.    Recognize the warning signs:  · Abrupt changes in personality, positive or negative- including increase in energy   · Giving away possessions  · Change in eating patterns- significant weight changes-  positive or negative  · Change in sleeping patterns- unable to sleep or sleeping all the time   · Unwillingness or inability to communicate  · Depression  · Unusual sadness, discouragement and loneliness  · Talk of wanting to die  · Neglect of personal appearance   · Rebelliousness- reckless behavior  · Withdrawal from people/activities they love  · Confusion- inability to concentrate     If you or a loved one observes any of these behaviors or has concerns about self-harm, here's what you can do:  · Talk about it- your feelings and reasons for harming yourself  · Remove any means that you might use to hurt yourself (examples: pills, rope, extension cords, firearm)  · Get professional help from the community (Mental Health, Substance Abuse, psychological counseling)  · Do not be alone:Call your Safe  Contact- someone whom you trust who will be there for you.  · Call your local CRISIS HOTLINE 360-8232 or 624-348-7484  · Call your local Children's Mobile Crisis Response Team [place] (556) 016-3179 or www.M2G  · Call the toll free National Suicide Prevention Hotlines   · National Suicide Prevention Lifeline 768-701-VFKG (7377)  · National [employer] Line Network 800-SUICIDE (134-6090)

## 2021-05-16 NOTE — PROGRESS NOTES
Subjective:   Joy Staples is a 51 y.o. female who presents for Hip Pain        RLQ Pain  This is a new problem. The current episode started yesterday. The onset quality is sudden. The problem occurs constantly. The pain is at a severity of 8/10. The abdominal pain radiates to the back. Associated symptoms include constipation (3-4 weeks ) and nausea. Pertinent negatives include no dysuria, fever, frequency or vomiting. She has tried acetaminophen for the symptoms. The treatment provided no relief. Her past medical history is significant for abdominal surgery (hysterectomy 1.5 years ago, cholecystectomy 4 years ago ) and gallstones. There is no history of irritable bowel syndrome, pancreatitis or ulcerative colitis.       Last BM: this morning, hard and small. No blood in stool.     No colonoscopy     Review of Systems   Constitutional: Negative for fever.   Gastrointestinal: Positive for constipation (3-4 weeks ) and nausea. Negative for vomiting.   Genitourinary: Negative for dysuria and frequency.       PMH:  has a past medical history of Colloid cyst of brain (HCC).  MEDS: No current facility-administered medications for this visit.    Current Outpatient Medications:   •  estradiol (CLIMARA) 0.05 MG/24HR PATCH WEEKLY, APPLY 1 PATCH TOPICALLY ONCE A WEEK, Disp: , Rfl: 8    Facility-Administered Medications Ordered in Other Visits:   •  lactated ringers infusion (BOLUS), 1,000 mL, Intravenous, Once, Rufus Carlin M.D.  •  ondansetron (ZOFRAN) syringe/vial injection 4 mg, 4 mg, Intravenous, Once, Rufus Carlin M.D.  •  morphine (pf) 4 mg/mL injection 4 mg, 4 mg, Intravenous, Q30 MIN PRN, Rufus Carlin M.D.  ALLERGIES: No Known Allergies  SURGHX:   Past Surgical History:   Procedure Laterality Date   • CHOLECYSTECTOMY     • CRANIOTOMY       SOCHX:  reports that she has never smoked. She has never used smokeless tobacco. She reports current alcohol use of about 0.6 oz of alcohol per week. She reports that  "she does not use drugs.  Family History   Problem Relation Age of Onset   • Heart Disease Father    • Diabetes Brother         Objective:   /58 (BP Location: Left arm, Patient Position: Sitting, BP Cuff Size: Adult long)   Pulse 74   Temp 36.5 °C (97.7 °F) (Temporal)   Resp 16   Ht 1.6 m (5' 3\") Comment: pt stated  Wt 73.5 kg (162 lb)   LMP 12/14/2018   SpO2 95%   BMI 28.70 kg/m²     Physical Exam  Vitals reviewed.   Constitutional:       General: She is not in acute distress.     Appearance: She is well-developed.   HENT:      Head: Normocephalic and atraumatic.      Right Ear: External ear normal.      Left Ear: External ear normal.   Neck:      Trachea: No tracheal deviation.   Pulmonary:      Effort: Pulmonary effort is normal.   Abdominal:      General: There is no distension.      Palpations: Abdomen is soft.      Tenderness: There is abdominal tenderness in the right lower quadrant. There is guarding. There is no right CVA tenderness, left CVA tenderness or rebound. Negative signs include Persaud's sign.   Skin:     General: Skin is warm and dry.      Capillary Refill: Capillary refill takes less than 2 seconds.   Neurological:      Mental Status: She is alert and oriented to person, place, and time.   Psychiatric:         Mood and Affect: Mood normal.         Behavior: Behavior normal.           Assessment/Plan:     1. RLQ abdominal pain     2. Nausea     3. Anorexia       Pt pain is described as 8/10, she is tender on exam and guarding. Ddx appendicitis, unable to obtain outpatient imaging or labs. The patient is referred to a higher level of care at [facility] now by POV transportation with significant other for evaluation and treatment; patient aware of location of [facility]. We discussed risks of non-compliance or delayed medical care. All questions answered to patient’s apparent satisfaction. Patient verbalizes understanding and agreement with plan of care.          Please note that this " dictation was created using voice recognition software. I have made every reasonable attempt to correct obvious errors, but I expect that there are errors of grammar and possibly content that I did not discover before finalizing the note.

## 2021-05-16 NOTE — ANESTHESIA TIME REPORT
Anesthesia Start and Stop Event Times     Date Time Event    5/16/2021 1359 Anesthesia Start     1405 Ready for Procedure     1500 Anesthesia Stop        Responsible Staff  05/16/21    Name Role Begin End    Hank Calix M.D. Anesth 1359 1500        Preop Diagnosis (Free Text):  Pre-op Diagnosis     acute Appendicitis        Preop Diagnosis (Codes):    Post op Diagnosis  Acute appendicitis      Premium Reason  E. Weekend    Comments:

## 2021-05-16 NOTE — ED PROVIDER NOTES
ED Provider Note    CHIEF COMPLAINT  Chief Complaint   Patient presents with   • RLQ Pain   • Nausea       HPI  Joy Staples is a 51 y.o. female with a history of acquired cyst of the brain, status post resection in 2005 who presents with complaints of right lower quadrant abdominal pain since yesterday.  The patient says she woke up yesterday morning was having some pain to the right lower abdomen.  He was able to eat and drink prior to going to work.  Towards the end of the day at work she noticed the pain was getting worse.  After going home she was able to eat dinner, but went to bed early.  She woke this morning with worsening pain, and nausea and presents emerge department.  Patient has had a decreased appetite, has had nothing to eat today.  She denies any fever, shaking chills, sore throat, cough, congestion, any difficulty breathing.  She has had no vomiting or diarrhea.  She has had a previous cholecystectomy and hysterectomy.    REVIEW OF SYSTEMS  See HPI for further details. All other systems are negative.     PAST MEDICAL HISTORY  Past Medical History:   Diagnosis Date   • Colloid cyst of brain (HCC)     s/p resection 2005       FAMILY HISTORY  Family History   Problem Relation Age of Onset   • Heart Disease Father    • Diabetes Brother        SOCIAL HISTORY  Social History     Tobacco Use   • Smoking status: Never Smoker   • Smokeless tobacco: Never Used   Substance Use Topics   • Alcohol use: Yes     Alcohol/week: 0.6 oz     Types: 1 Shots of liquor per week     Comment: Occasionally   • Drug use: No      Social History     Substance and Sexual Activity   Drug Use No       SURGICAL HISTORY  Past Surgical History:   Procedure Laterality Date   • CHOLECYSTECTOMY     • CRANIOTOMY         CURRENT MEDICATIONS  Home Medications     Reviewed by Malina Hemphill (Pharmacy Tech) on 05/16/21 at 1316  Med List Status: Complete    Medication Last Dose Status    Acetaminophen (TYLENOL PO) 5/15/2021  "Active    Cholecalciferol (D3 VITAMIN PO) >1 week Active    COLLAGEN PO > 1 week Active    estradiol (CLIMARA) 0.05 MG/24HR PATCH WEEKLY 5/9/2021 Active    GLUCOSAMINE-CHONDROITIN PO >1 week Active    ibuprofen (MOTRIN) 200 MG Tab 5/15/2021 Active                ALLERGIES  No Known Allergies    PHYSICAL EXAM0  VITAL SIGNS: Blood Pressure 103/67   Pulse 70   Temperature 36.6 °C (97.9 °F) (Temporal)   Respiration 19   Height 1.6 m (5' 3\")   Weight 73.9 kg (162 lb 14.7 oz)   Last Menstrual Period 12/14/2018   Oxygen Saturation 99%   Body Mass Index 28.86 kg/m²   Constitutional: Awake, alert, in no acute distress, Non-toxic appearance.   HENT: Atraumatic. Bilateral external ears normal, mucous membranes moist, throat nonerythematous without exudates, nose is normal.  Eyes: PERRL, EOMI, conjunctiva moist, noninjected.  Neck: Nontender, Normal range of motion, No nuchal rigidity, No stridor.   Lymphatic: No lymphadenopathy noted.   Cardiovascular: Regular rate and rhythm, no murmurs, rubs, gallops.  Thorax & Lungs:  Good breath sounds bilaterally, no wheezes, rales, or retractions.  No chest tenderness.  Abdomen: Bowel sounds normal, Soft, nontender, nondistended, no rebound, guarding, masses.  Back: No CVA or spinal tenderness.  Extremities: Intact distal pulses, No edema, No tenderness.   Skin: Warm, Dry, No rashes.   Musculoskeletal: No joint swelling or tenderness.  Neurologic: Alert & oriented x 3, sensory and motor function normal. No focal deficits.   Psychiatric: Affect normal, Judgment normal, Mood normal.     EKG  EKG Interpretation:  Interpreted by me    Rhythm:  Normal sinus rhythm   Rate: 64  Intervals: First-degree AV block, NC interval 228 ms, QTc interval normal  Axis: normal  Ectopy: none  Conduction: normal  ST Segments: no evidence of elevation or depression  T Waves: no acute change  Q Waves: none  Clinical Impression: No acute injury or ischemic pattern.   Comparison to previous EKG: None " available      LABS  Labs Reviewed   CBC WITH DIFFERENTIAL - Abnormal; Notable for the following components:       Result Value    WBC 12.1 (*)     MCHC 32.4 (*)     Lymphocytes 21.10 (*)     Neutrophils (Absolute) 8.47 (*)     Monos (Absolute) 0.90 (*)     All other components within normal limits   COMP METABOLIC PANEL   LIPASE   URINALYSIS   ESTIMATED GFR   SARS-COV ANTIGEN LAZARO    Narrative:     Have you been in close contact with a person who is suspected  or known to be positive for COVID-19 within the last 30 days  (e.g. last seen that person < 30 days ago)->Unknown   SARS-COV-2, PCR (IN-HOUSE)    Narrative:     Have you been in close contact with a person who is suspected  or known to be positive for COVID-19 within the last 30 days  (e.g. last seen that person < 30 days ago)->Unknown       All labs reviewed by me.      RADIOLOGY/PROCEDURES  CT-ABDOMEN-PELVIS WITH   Final Result      1.  Findings consistent with acute appendicitis without evidence of perforation.      2.  This was discussed with Dr. Rufus Pickett at 12:40 PM.          The radiologist's interpretations of all radiological studies have been reviewed by me.        COURSE & MEDICAL DECISION MAKING  Pertinent Labs & Imaging studies reviewed. (See chart for details)  The patient presents with above complaints.  On examination she does have significant tenderness at McBurney's point in the right lower quadrant.  IV was placed, she was given a bolus of lactated Ringer's.  She declined any pain medication.  CBC shows elevated white count of 12,100, hemoglobin 12.9, 70% polys, 20% lymphs, chemistry profile normal, lipase normal, urinalysis negative.  CT scan of abdomen/pelvis with IV contrast shows findings consistent with acute appendicitis.  Findings were discussed with the patient and family.  Given her symptoms she will be admitted.  Dr. Magana on-call for general surgery was consulted and will be taken the patient to the operating room.  Patient was  given a dose of Rocephin and Flagyl.        FINAL IMPRESSION  1. Acute appendicitis, unspecified acute appendicitis type          Electronically signed by: Rufus Carlin M.D., 5/16/2021 11:51 AM

## 2021-05-16 NOTE — ED TRIAGE NOTES
"Presents accompanied by her family.  She C/O acute onset of RLQ abdominal pain with associated nausea recurring since yesterday.  Chief Complaint   Patient presents with   • RLQ Pain   • Nausea     /67   Pulse 70   Temp 36.6 °C (97.9 °F) (Temporal)   Resp 19   Ht 1.6 m (5' 3\")   Wt 73.9 kg (162 lb 14.7 oz)   LMP 12/14/2018   SpO2 97%   BMI 28.86 kg/m²      "

## 2021-05-16 NOTE — ED NOTES
Med rec updated and complete  Allergies reviewed  Interviewed pt with  at bedside with permission from pt.  Pt reports no antibiotics in the last 2 weeks     No current facility-administered medications on file prior to encounter.     Current Outpatient Medications on File Prior to Encounter   Medication Sig Dispense Refill   • Acetaminophen (TYLENOL PO) Take 2 Tablets by mouth as needed (For pain). Pt is not sure the strength     • ibuprofen (MOTRIN) 200 MG Tab Take 400 mg by mouth every 6 hours as needed for Mild Pain.     • GLUCOSAMINE-CHONDROITIN PO Take 1 tablet by mouth every day.     • COLLAGEN PO Take 1 tablet by mouth every day.     • Cholecalciferol (D3 VITAMIN PO) Take 1 capsule by mouth every day.     • estradiol (CLIMARA) 0.05 MG/24HR PATCH WEEKLY Place 1 Patch on the skin every 7 days. On Sunday 8

## 2021-05-16 NOTE — ANESTHESIA PROCEDURE NOTES
Airway    Date/Time: 5/16/2021 2:13 PM  Performed by: Hank Calix M.D.  Authorized by: Hank Calix M.D.     Location:  OR  Urgency:  Elective  Difficult Airway: No    Indications for Airway Management:  Anesthesia      Spontaneous Ventilation: absent    Sedation Level:  Deep  Preoxygenated: Yes    Patient Position:  Sniffing  Mask Difficulty Assessment:  1 - vent by mask  Final Airway Type:  Endotracheal airway  Final Endotracheal Airway:  ETT  Cuffed: Yes    Technique Used for Successful ETT Placement:  Direct laryngoscopy    Insertion Site:  Oral  Blade Type:  Hsieh  Laryngoscope Blade/Videolaryngoscope Blade Size:  2  ETT Size (mm):  7.0  Measured from:  Teeth  ETT to Teeth (cm):  21  Placement Verified by: auscultation and capnometry    Cormack-Lehane Classification:  Grade I - full view of glottis  Number of Attempts at Approach:  1

## 2021-05-16 NOTE — ANESTHESIA PREPROCEDURE EVALUATION
Relevant Problems   Other   (positive) Obesity (BMI 30-39.9)     Anes H&P:  PAST MEDICAL HISTORY:   51 y.o. female who presents for Procedure(s):  APPENDECTOMY, LAPAROSCOPIC.  She has current and past medical problems significant for:    Past Medical History:   Diagnosis Date   • Colloid cyst of brain (HCC)     s/p resection 2005       SMOKING/ALCOHOL/RECREATIONAL DRUG USE:  Social History     Tobacco Use   • Smoking status: Never Smoker   • Smokeless tobacco: Never Used   Substance Use Topics   • Alcohol use: Yes     Alcohol/week: 0.6 oz     Types: 1 Shots of liquor per week     Comment: Occasionally   • Drug use: No     Social History     Substance and Sexual Activity   Drug Use No       PAST SURGICAL HISTORY:  Past Surgical History:   Procedure Laterality Date   • CHOLECYSTECTOMY     • CRANIOTOMY         ALLERGIES:   No Known Allergies    MEDICATIONS:  No current facility-administered medications on file prior to encounter.     Current Outpatient Medications on File Prior to Encounter   Medication Sig Dispense Refill   • Acetaminophen (TYLENOL PO) Take 2 Tablets by mouth as needed (For pain). Pt is not sure the strength     • ibuprofen (MOTRIN) 200 MG Tab Take 400 mg by mouth every 6 hours as needed for Mild Pain.     • GLUCOSAMINE-CHONDROITIN PO Take 1 tablet by mouth every day.     • COLLAGEN PO Take 1 tablet by mouth every day.     • Cholecalciferol (D3 VITAMIN PO) Take 1 capsule by mouth every day.     • estradiol (CLIMARA) 0.05 MG/24HR PATCH WEEKLY Place 1 Patch on the skin every 7 days. On Sunday  8       LABS:  Lab Results   Component Value Date/Time    HEMOGLOBIN 12.9 05/16/2021 1140    HEMATOCRIT 39.8 05/16/2021 1140    WBC 12.1 (H) 05/16/2021 1140     Lab Results   Component Value Date/Time    SODIUM 140 05/16/2021 1140    POTASSIUM 4.3 05/16/2021 1140    CHLORIDE 104 05/16/2021 1140    CO2 26 05/16/2021 1140    GLUCOSE 98 05/16/2021 1140    BUN 13 05/16/2021 1140    CALCIUM 8.6 05/16/2021 1140        SARS-CoV2 result: Negative      PREVIOUS ANESTHETICS: See EMR  __________________________________________      Physical Exam    Airway   Mallampati: II  TM distance: >3 FB  Neck ROM: full       Cardiovascular - normal exam  Rhythm: regular  Rate: normal  (-) murmur     Dental - normal exam           Pulmonary - normal exam  Breath sounds clear to auscultation     Abdominal    Neurological - normal exam                 Anesthesia Plan    ASA 2- EMERGENT   ASA physical status emergent criteria: acutely contaminated wound or identified infection source    Plan - general       Airway plan will be ETT          Induction: intravenous and rapid sequence    Postoperative Plan: Postoperative administration of opioids is intended.    Pertinent diagnostic labs and testing reviewed    Informed Consent:    Anesthetic plan and risks discussed with patient.    Use of blood products discussed with: patient whom consented to blood products.

## 2021-05-16 NOTE — OR NURSING
Patint to And from OF.  Pt assessed per flowsheets.  Vitals per flowsheet.  Pt family at bedside and patient dressed and oob to chair.  Pt to be discharged to fromt door via wheelchair acompanied by staff.

## 2021-05-16 NOTE — ANESTHESIA POSTPROCEDURE EVALUATION
Patient: Joy Staples    Procedure Summary     Date: 05/16/21 Room / Location:  OR 06 / SURGERY [facility]    Anesthesia Start: 1359 Anesthesia Stop: 1500    Procedure: APPENDECTOMY, LAPAROSCOPIC (Abdomen) Diagnosis: (acute Appendicitis)    Surgeons: Hank Magana M.D. Responsible Provider: Hank Calix M.D.    Anesthesia Type: general ASA Status: 2 - Emergent          Final Anesthesia Type: general  Last vitals  BP   Blood Pressure: (!) 91/63    Temp   36.5 °C (97.7 °F)    Pulse   79   Resp   16    SpO2   98 %      Anesthesia Post Evaluation    Patient location during evaluation: PACU  Patient participation: complete - patient participated  Level of consciousness: sleepy but conscious    Airway patency: patent  Anesthetic complications: no  Cardiovascular status: hemodynamically stable  Respiratory status: acceptable  Hydration status: balanced    PONV: none          No complications documented.     Nurse Pain Score: 0 (NPRS)

## 2021-05-16 NOTE — H&P
5/16  ATSP by Dr Carlin for Appendicitis    HPI: 51y F here with abdominal pain since yesterday which has been persistent.  She has noted the pain getting worse and came to the ED.  She reports some decreased appetite with nausea but no emesis.  She has never had similar pain before    PMHx: Colloid cyst of brain    PSHx: Brain cyst resection 2005; [facility]; Hysterectomy    Meds: see Med Rec, no anticoagulation    NKDA    FamHx: no colon/rectal cancers, no other pertinent family history    SocHx: Drink occasionally, no tobacco or street druges      ROS: negative except as above    Consitutional- above  HEENT- no visual changes, no sneezing or runny nose  Skin- no rashes or itching  Cardiovascular- no chest pain or palpatations  Respiratory- no SOB or cough  GI- above  - no dysurea  Neuro- no weakness or syncope  Musculoskeletal- no muscle or joint pain  Heme- no bleeding or bruising  Lymphatic- no enlarged nodes or previous splenectomy  Endocrine- No sweating or heat/cold intolerance  Allergy- No asthma or hives  Psychiatric- no depression or anxiety        Physical Exam:   AFVSS  A@O x3, NAD  NCAT, no scleral icterus  Neck nontender, no lymphadenopathy  Normal respiratory effort, no chest wall masses  RRR, 2+ pulses  Abdomen soft, no peritonitis, no masses, tender in RLQ  Extremities warm and well perfused  No skin rashes or lesions    Labs: WBC 12.1, Hct 40, PLt 324  Lytes wnl  LFTs wnl    Radiology: CT A/P: 1.  Findings consistent with acute appendicitis without evidence of perforation.    A/P: 51y F here with Appendicitis.  Discussed risks/benefits/alternatives of surgery with her, will proceed to OR for Lap Appy.  She is NPO and consents to the planned procedure.

## 2021-05-17 ENCOUNTER — HOSPITAL ENCOUNTER (EMERGENCY)
Facility: MEDICAL CENTER | Age: 51
End: 2021-05-17
Attending: EMERGENCY MEDICINE
Payer: MEDICAID

## 2021-05-17 VITALS
HEIGHT: 63 IN | DIASTOLIC BLOOD PRESSURE: 63 MMHG | OXYGEN SATURATION: 90 % | SYSTOLIC BLOOD PRESSURE: 110 MMHG | BODY MASS INDEX: 28.55 KG/M2 | TEMPERATURE: 97.9 F | HEART RATE: 80 BPM | RESPIRATION RATE: 16 BRPM | WEIGHT: 161.16 LBS

## 2021-05-17 VITALS
SYSTOLIC BLOOD PRESSURE: 114 MMHG | RESPIRATION RATE: 16 BRPM | HEART RATE: 85 BPM | OXYGEN SATURATION: 95 % | DIASTOLIC BLOOD PRESSURE: 78 MMHG | BODY MASS INDEX: 28.55 KG/M2 | TEMPERATURE: 97.8 F | WEIGHT: 161.16 LBS | HEIGHT: 63 IN

## 2021-05-17 DIAGNOSIS — Z98.890 POST-OPERATIVE NAUSEA AND VOMITING: Primary | ICD-10-CM

## 2021-05-17 DIAGNOSIS — R11.2 POST-OPERATIVE NAUSEA AND VOMITING: Primary | ICD-10-CM

## 2021-05-17 LAB
SARS-COV-2 RNA RESP QL NAA+PROBE: NOTDETECTED
SPECIMEN SOURCE: NORMAL

## 2021-05-17 PROCEDURE — 700111 HCHG RX REV CODE 636 W/ 250 OVERRIDE (IP): Performed by: EMERGENCY MEDICINE

## 2021-05-17 PROCEDURE — 96374 THER/PROPH/DIAG INJ IV PUSH: CPT

## 2021-05-17 PROCEDURE — 99284 EMERGENCY DEPT VISIT MOD MDM: CPT

## 2021-05-17 PROCEDURE — 96375 TX/PRO/DX INJ NEW DRUG ADDON: CPT

## 2021-05-17 PROCEDURE — 302449 STATCHG TRIAGE ONLY (STATISTIC)

## 2021-05-17 PROCEDURE — 700105 HCHG RX REV CODE 258: Performed by: EMERGENCY MEDICINE

## 2021-05-17 RX ORDER — SODIUM CHLORIDE 9 MG/ML
1000 INJECTION, SOLUTION INTRAVENOUS ONCE
Status: COMPLETED | OUTPATIENT
Start: 2021-05-17 | End: 2021-05-17

## 2021-05-17 RX ORDER — ONDANSETRON 4 MG/1
4 TABLET, ORALLY DISINTEGRATING ORAL EVERY 6 HOURS PRN
Qty: 16 TABLET | Refills: 0 | Status: SHIPPED | OUTPATIENT
Start: 2021-05-17 | End: 2021-05-21

## 2021-05-17 RX ORDER — METOCLOPRAMIDE HYDROCHLORIDE 5 MG/ML
10 INJECTION INTRAMUSCULAR; INTRAVENOUS ONCE
Status: COMPLETED | OUTPATIENT
Start: 2021-05-17 | End: 2021-05-17

## 2021-05-17 RX ORDER — ONDANSETRON 2 MG/ML
4 INJECTION INTRAMUSCULAR; INTRAVENOUS ONCE
Status: DISCONTINUED | OUTPATIENT
Start: 2021-05-17 | End: 2021-05-17 | Stop reason: HOSPADM

## 2021-05-17 RX ORDER — DIPHENHYDRAMINE HYDROCHLORIDE 50 MG/ML
25 INJECTION INTRAMUSCULAR; INTRAVENOUS ONCE
Status: COMPLETED | OUTPATIENT
Start: 2021-05-17 | End: 2021-05-17

## 2021-05-17 RX ORDER — SODIUM CHLORIDE 9 MG/ML
1000 INJECTION, SOLUTION INTRAVENOUS ONCE
Status: DISCONTINUED | OUTPATIENT
Start: 2021-05-17 | End: 2021-05-17 | Stop reason: HOSPADM

## 2021-05-17 RX ADMIN — SODIUM CHLORIDE 1000 ML: 9 INJECTION, SOLUTION INTRAVENOUS at 03:30

## 2021-05-17 RX ADMIN — METOCLOPRAMIDE 10 MG: 5 INJECTION, SOLUTION INTRAMUSCULAR; INTRAVENOUS at 03:30

## 2021-05-17 RX ADMIN — DIPHENHYDRAMINE HYDROCHLORIDE 25 MG: 50 INJECTION INTRAMUSCULAR; INTRAVENOUS at 03:51

## 2021-05-17 ASSESSMENT — ENCOUNTER SYMPTOMS
VOMITING: 1
COUGH: 0
CHILLS: 0
ABDOMINAL PAIN: 1
NAUSEA: 1
FEVER: 0

## 2021-05-17 ASSESSMENT — PAIN DESCRIPTION - PAIN TYPE: TYPE: ACUTE PAIN

## 2021-05-17 ASSESSMENT — FIBROSIS 4 INDEX: FIB4 SCORE: 0.54

## 2021-05-17 NOTE — ED NOTES
In 2005 pt states she had a brain tumor removed and has been having nausea since then but no vomit

## 2021-05-17 NOTE — ED NOTES
Discharge paperwork given to pt, all questions answered, all belongings accounted for, pt able to ambulate with steady gait to the ER exit

## 2021-05-17 NOTE — ED NOTES
Pt has 5/10 pain in her left lower quadrant, surgery site intact, pt states she has been having nausea with vomit 8x since being discharged after surgery at 1600 5/16

## 2021-05-17 NOTE — OP REPORT
DATE OF SERVICE:  05/16/2021     PREOPERATIVE DIAGNOSIS:  Appendicitis.     POSTOPERATIVE DIAGNOSIS:  Appendicitis.     PROCEDURE:  Laparoscopic appendectomy.     SURGEON:  Hank Magana MD     ASSISTANT:  RAE Duckworth     ANESTHESIA:  General endotracheal anesthesia.     ESTIMATED BLOOD LOSS:  5 mL.     SPECIMENS:  Appendix.     COMPLICATIONS:  None.     CONDITION:  Stable.     INDICATIONS FOR PROCEDURE:  This is a 51-year-old female who presents with   right lower quadrant abdominal pain and CT-confirmed appendicitis.  Risks,   benefits and alternatives of surgery explained to her before proceeding.     OPERATIVE FINDINGS:  Inflamed appendix removed with hemostasis.     OPERATIVE TECHNIQUE:  After informed consent was obtained, the patient was   taken to the operating room and placed in supine position.  After adequate   endotracheal anesthesia was achieved, the abdomen was prepped and draped in   sterile fashion.  Operation was begun by placing a 5 mm periumbilical incision   through which 5 mm trocar was introduced into the abdomen using Optiview   technique.  After pneumoperitoneum was achieved, additional trocars were   placed, 12 mm in the left lower quadrant and then 5 mm in the suprapubic   midline.  All trocars were placed with 0.5% Marcaine with epinephrine for   local anesthesia.     The patient was positioned and the inflamed appendix was identified.  We used   a EnSeal device to divide the appendiceal mesentery with hemostasis.  We then   divided the appendiceal base with a blue load 35 mm staple fire.  The appendix   was placed in an EndoCatch bag and removed after dilation from the 12 mm   trocar site.  Hemostasis was noted in the operative field.  The extraction   site fascia was closed with an 0 PDS using an Endoclose device.    Pneumoperitoneum was reduced and all trocars were removed.  Skin incisions   were closed with 4-0 Monocryl and Dermabond.  The patient was returned to the    PACU in stable condition.  All instrument counts were correct at the end of   the procedure.  Case required an assistant for trocar placements, camera operation, and wound closures.        ______________________________  MD MICHAEL Cedillo/HILLARY/MAGGIE    DD:  05/16/2021 14:48  DT:  05/16/2021 16:52    Job#:  [id number]

## 2021-05-17 NOTE — ED TRIAGE NOTES
"Chief Complaint   Patient presents with   • N/V     pt had an appendectomy on Sunday around 2pm and around 4pm patient was discharge. Pt has not been able to eat soup or water. Patient states keeps has had 6-8 episodes of vomitting.      Patient had an appendectomy on Sunday 5/16, patient went home and has not been able to tolerate any PO fluids or soup. C/O of some tenderness from surgery about 5/10 pain. PRN pain medication prescribed to patient, pt has not taken medication yet. GCS 15. Denies blood in emesis.     Pt is alert and oriented, speaking in full sentences, follows commands and responds appropriately to questions. Resp are even and unlabored.      Pt placed in lobby. Pt educated on triage process. Pt encouraged to alert staff for any changes.     Patient and staff wearing appropriate PPE    /78   Pulse 85   Temp 36.6 °C (97.8 °F) (Temporal)   Resp 16   Ht 1.6 m (5' 3\")   Wt 73.1 kg (161 lb 2.5 oz)   LMP 12/14/2018   SpO2 95%   BMI 28.55 kg/m²   "

## 2021-05-17 NOTE — ED TRIAGE NOTES
"Chief Complaint   Patient presents with   • N/V     pt had an appendectomy on Sunday around 2pm and around 4pm patient was discharge. Pt has not been able to eat soup or water. Patient states keeps has had 6-8 episodes of vomiting.        Patient had an appendectomy on Sunday 5/16, patient went home and has not been able to tolerate any PO fluids or soup. C/O of some tenderness from surgery about 5/10 pain. PRN pain medication prescribed to patient, pt has not taken medication yet. GCS 15. Denies blood in emesis.     Pt is alert and oriented, speaking in full sentences, follows commands and responds appropriately to questions. Resp are even and unlabored.      Pt placed in lobby. Pt educated on triage process. Pt encouraged to alert staff for any changes.     Patient and staff wearing appropriate PPE    /78   Pulse 85   Temp 36.6 °C (97.8 °F) (Temporal)   Resp 16   Ht 1.6 m (5' 3\")   Wt 73.1 kg (161 lb 2.5 oz)   LMP 12/14/2018   SpO2 95%   BMI 28.55 kg/m²   "

## 2021-05-17 NOTE — ED NOTES
Pt ambulated with steady gait around nurses station, no report of nausea, no dizziness, mild abdominal pain from incision sites, pt states she feels better

## 2021-05-17 NOTE — ED NOTES
Pt laying in bed eyes closed, pts o2 at 90%, pt placed on 2L, erp at bedside wants pt to start eating ice see how her stomach tolerates it, pt provided with cup of ice

## 2021-05-17 NOTE — ED NOTES
pts  states that pt has a history of anxiety,  states that pt is feeling overwhelmed with the current situation, pt is crying out lout, when asked if she is in pain pt states that she is having nausea and cannot vomit, erp made aware, erp ordered 25mg of benadryl

## 2021-05-17 NOTE — ED NOTES
Pt laying in bed, eyes closed, equal chest rise noted, no signs of distress noted, will continue to monitor

## 2021-05-17 NOTE — ED PROVIDER NOTES
"ED Provider Note   5/17/2021  3:35 AM    Means of Arrival: Walk In  History obtained by: patient and spouse  Limitations: none    CHIEF COMPLAINT  Chief Complaint   Patient presents with   • N/V     pt had an appendectomy on Sunday around 2pm and around 4pm patient was discharge. Pt has not been able to eat soup or water. Patient states keeps has had 6-8 episodes of vomiting.        HPI  Joy Staples is a 51 y.o. female who had a laparoscopic appendectomy this past afternoon.  She was discharged shortly after her surgery.  Since arriving home she has had nausea and has been unable to tolerate any fluids.  Pain has been moderately controlled with prescribed pain medication.  She has not had any fever.  She is having some flatus.    REVIEW OF SYSTEMS  Review of Systems   Constitutional: Negative for chills and fever.   Respiratory: Negative for cough.    Cardiovascular: Negative for chest pain.   Gastrointestinal: Positive for abdominal pain, nausea and vomiting.     See HPI for further details.     PAST MEDICAL HISTORY   Otherwise healthy    SOCIAL HISTORY  Social History     Tobacco Use   • Smoking status: Former Smoker   • Smokeless tobacco: Never Used   Substance and Sexual Activity   • Alcohol use: Never   • Drug use: Never   • Sexual activity: Not on file       SURGICAL HISTORY   has a past surgical history that includes appendectomy (05/16/2021).    CURRENT MEDICATIONS  Home Medications     Reviewed by Jody Elizalde R.N. (Registered Nurse) on 05/17/21 at 0324  Med List Status: Partial   Medication Last Dose Status        Patient Sonny Taking any Medications                       ALLERGIES  No Known Allergies    PHYSICAL EXAM  VITAL SIGNS: /63   Pulse 80   Temp 36.6 °C (97.9 °F) (Temporal)   Resp 16   Ht 1.6 m (5' 3\")   Wt 73.1 kg (161 lb 2.5 oz)   SpO2 90%   BMI 28.55 kg/m²    Pulse ox interpretation: I interpret this pulse ox as normal.  Constitutional: Anxious appearing 51-year-old woman " holding emesis bag.  HENT: Normocephalic, Atraumatic, Bilateral external ears normal. Nose normal.   Eyes: Pupils are equal. Conjunctiva normal, non-icteric.   Heart: Regular rate and rythm, no murmurs.    Lungs: No respiratory distress, regular respirations. Clear to auscultation bilaterally.  Abdomen: Surgical site wounds are c/d/i. Appropriately tender abdomen, no distension.  Infrequent bowel sounds.  Skin: Warm, Dry, No erythema, No rash.   Neurologic: Alert, Grossly non-focal. No slurred speech. Moving extremities normally.   Psychiatric: Affect normal, Judgment normal, Mood normal, Appears appropriate and not intoxicated.   Physical Exam      COURSE & MEDICAL DECISION MAKING  Pertinent Labs & Imaging studies reviewed. (See chart for details)    3:35 AM This is an emergent evaluation of a 51 y.o., female who presents several hours status post laparoscopic appendectomy now with nausea, vomiting, abdominal discomfort.  Incisions appear clean dry and intact.  Her abdomen is appropriately tender without significant distention.  There are infrequent bowel sounds.  I have suspicion that she has possible ileus versus postoperative nausea.  I do not suspect catastrophic complication from surgery.  I have notified Dr. Magana, her surgeon, that she is here in the emergency department.  He agrees with my plan to treat her symptoms.  He will not be seeing her unless there are significant changes in her presentation.  She will be treated with Reglan and Benadryl.  She will receive 1 L fluid bolus.    6:36 AM  Resting comfortably. Abdomen soft and appropriately tender. She has been tolerating oral intake. I believe she is safe for discharge at this time. She and her  are agreeable with this plan. I will prescribe zofran for her.  She will follow up with Dr. Magana as planned for post operative appointment. She can come back to ER at anytime if significant changes.      The patient will return for worsening symptoms  and is stable at the time of discharge. The patient verbalizes understanding. Guidance was provided on appropriate use of medications including driving under the influence, overdose, and side effects.     FINAL IMPRESSION  1. Post-operative nausea and vomiting Active     .         Electronically signed by: Mckinley Duggan II, M.D., 5/17/2021 3:35 AM

## 2022-02-21 ENCOUNTER — HOSPITAL ENCOUNTER (OUTPATIENT)
Facility: MEDICAL CENTER | Age: 52
End: 2022-02-21
Attending: NURSE PRACTITIONER
Payer: COMMERCIAL

## 2022-02-21 ENCOUNTER — OFFICE VISIT (OUTPATIENT)
Dept: URGENT CARE | Facility: CLINIC | Age: 52
End: 2022-02-21
Payer: COMMERCIAL

## 2022-02-21 VITALS
RESPIRATION RATE: 14 BRPM | DIASTOLIC BLOOD PRESSURE: 82 MMHG | SYSTOLIC BLOOD PRESSURE: 120 MMHG | WEIGHT: 158 LBS | HEART RATE: 74 BPM | BODY MASS INDEX: 28 KG/M2 | TEMPERATURE: 97.5 F | HEIGHT: 63 IN | OXYGEN SATURATION: 97 %

## 2022-02-21 DIAGNOSIS — L29.0 ANAL ITCHING: ICD-10-CM

## 2022-02-21 DIAGNOSIS — Z00.00 HEALTH CARE MAINTENANCE: ICD-10-CM

## 2022-02-21 DIAGNOSIS — R39.15 URINARY URGENCY: ICD-10-CM

## 2022-02-21 DIAGNOSIS — M79.672 LEFT FOOT PAIN: ICD-10-CM

## 2022-02-21 DIAGNOSIS — R35.0 URINARY FREQUENCY: ICD-10-CM

## 2022-02-21 DIAGNOSIS — R30.0 DYSURIA: ICD-10-CM

## 2022-02-21 LAB
APPEARANCE UR: CLEAR
BILIRUB UR STRIP-MCNC: NEGATIVE MG/DL
COLOR UR AUTO: YELLOW
GLUCOSE UR STRIP.AUTO-MCNC: NEGATIVE MG/DL
KETONES UR STRIP.AUTO-MCNC: NEGATIVE MG/DL
LEUKOCYTE ESTERASE UR QL STRIP.AUTO: ABNORMAL
NITRITE UR QL STRIP.AUTO: NEGATIVE
PH UR STRIP.AUTO: 8.5 [PH] (ref 5–8)
PROT UR QL STRIP: NEGATIVE MG/DL
RBC UR QL AUTO: NEGATIVE
SP GR UR STRIP.AUTO: 1.01
UROBILINOGEN UR STRIP-MCNC: 0.2 MG/DL

## 2022-02-21 PROCEDURE — 87077 CULTURE AEROBIC IDENTIFY: CPT

## 2022-02-21 PROCEDURE — 81002 URINALYSIS NONAUTO W/O SCOPE: CPT | Performed by: NURSE PRACTITIONER

## 2022-02-21 PROCEDURE — 99214 OFFICE O/P EST MOD 30 MIN: CPT | Performed by: NURSE PRACTITIONER

## 2022-02-21 PROCEDURE — 87086 URINE CULTURE/COLONY COUNT: CPT

## 2022-02-21 PROCEDURE — 87186 SC STD MICRODIL/AGAR DIL: CPT

## 2022-02-21 RX ORDER — NITROFURANTOIN 25; 75 MG/1; MG/1
100 CAPSULE ORAL EVERY 12 HOURS
Qty: 10 CAPSULE | Refills: 0 | Status: SHIPPED | OUTPATIENT
Start: 2022-02-21 | End: 2022-02-26

## 2022-02-21 ASSESSMENT — FIBROSIS 4 INDEX: FIB4 SCORE: 0.54

## 2022-02-21 NOTE — PROGRESS NOTES
Subjective:     Joy Staples is a 51 y.o. female who presents for UTI (Burning and frequency x2days), Itching (Skin is dry and eyes are also dry and itching/), and Other (Podiatry referral for bump on the side of her foot/ )      UTI  Pertinent negatives include no abdominal pain, chills, fever, headaches, myalgias, nausea, neck pain or vomiting.   Other  Pertinent negatives include no abdominal pain, chills, fever, headaches, myalgias, nausea, neck pain or vomiting.     Pt presents for evaluation of a new problem.  Joy is a 51-year-old female presents to urgent care today with complaints of painful urination and urinary frequency that began approximately 2 days ago.  She has not used any medication for the relief of her symptoms.  Negative for hematuria, fever, chills, nausea/vomiting or flank pain.  Her symptoms remain unchanged.  Additionally, she is requesting podiatry referral as she was diagnosed with possible bunions several years ago.  She states that she has been wrapping her feet with tape for questioning.  Her pain is worse with wearing shoes with walking.  Lastly, for the last several months she has been experiencing a crawling sensation in her anus and in her vagina.  She is concerned that she has pinworms as she has been diagnosed with this as a child.  She notes that the sensation is similar.  She has not visualized any worms or signs of parasites.  She has not been out of the country recently.    Review of Systems   Constitutional: Negative for chills and fever.   Gastrointestinal: Negative for abdominal pain, diarrhea, nausea and vomiting.   Genitourinary: Positive for dysuria, frequency and urgency. Negative for flank pain and hematuria.   Musculoskeletal: Positive for joint pain. Negative for back pain, falls, myalgias and neck pain.   Neurological: Negative for headaches.       PMH:   Past Medical History:   Diagnosis Date   • Colloid cyst of brain (HCC)     s/p resection 2005  "    ALLERGIES: No Known Allergies  SURGHX:   Past Surgical History:   Procedure Laterality Date   • UT LAP,APPENDECTOMY  5/16/2021    Procedure: APPENDECTOMY, LAPAROSCOPIC;  Surgeon: Hank Magana M.D.;  Location: SURGERY [facility];  Service: General   • APPENDECTOMY  05/16/2021   • CHOLECYSTECTOMY     • CRANIOTOMY       SOCHX:   Social History     Socioeconomic History   • Marital status: Single   Tobacco Use   • Smoking status: Former Smoker   • Smokeless tobacco: Never Used   Substance and Sexual Activity   • Alcohol use: Never     Comment: Occasionally   • Drug use: Never   • Sexual activity: Yes   Social History Narrative    ** Merged History Encounter **          FH:   Family History   Problem Relation Age of Onset   • Heart Disease Father    • Diabetes Brother          Objective:   /82   Pulse 74   Temp 36.4 °C (97.5 °F) (Temporal)   Resp 14   Ht 1.6 m (5' 3\")   Wt 71.7 kg (158 lb)   LMP 12/14/2018   SpO2 97%   BMI 27.99 kg/m²     Physical Exam  Vitals and nursing note reviewed.   Constitutional:       General: She is not in acute distress.     Appearance: Normal appearance. She is not ill-appearing.   HENT:      Head: Normocephalic and atraumatic.      Right Ear: External ear normal.      Left Ear: External ear normal.      Nose: No congestion or rhinorrhea.      Mouth/Throat:      Mouth: Mucous membranes are moist.   Eyes:      Extraocular Movements: Extraocular movements intact.      Pupils: Pupils are equal, round, and reactive to light.   Cardiovascular:      Rate and Rhythm: Normal rate and regular rhythm.      Pulses: Normal pulses.      Heart sounds: Normal heart sounds.   Pulmonary:      Effort: Pulmonary effort is normal.      Breath sounds: Normal breath sounds.   Abdominal:      General: Abdomen is flat. Bowel sounds are normal.      Palpations: Abdomen is soft.      Tenderness: There is no abdominal tenderness. There is no right CVA tenderness or left CVA tenderness. "   Musculoskeletal:         General: Normal range of motion.      Cervical back: Normal range of motion and neck supple.   Skin:     General: Skin is warm and dry.      Capillary Refill: Capillary refill takes less than 2 seconds.   Neurological:      General: No focal deficit present.      Mental Status: She is alert and oriented to person, place, and time. Mental status is at baseline.   Psychiatric:         Mood and Affect: Mood normal.         Behavior: Behavior normal.         Thought Content: Thought content normal.         Judgment: Judgment normal.         Assessment/Plan:   Assessment    1. Left foot pain  Referral to Podiatry   2. Dysuria  POCT Urinalysis    Urine Culture    nitrofurantoin (MACROBID) 100 MG Cap   3. Urinary frequency  POCT Urinalysis    Urine Culture    nitrofurantoin (MACROBID) 100 MG Cap    CANCELED: POCT PREGNANCY   4. Urinary urgency  POCT Urinalysis    Urine Culture    nitrofurantoin (MACROBID) 100 MG Cap    CANCELED: POCT PREGNANCY   5. Anal itching  Complete O&P   6. Health care maintenance  Referral back to [facility] PCP     Referral given for podiatry follow-up.  She was empirically started on Macrobid for treatment of UTI.  Urine sent for culture and I will call her with results.  Patient to follow-up in urgent care or ER for fever, chills, nausea/vomiting or flank pain.  Ova and parasite stool culture ordered for evaluation of parasites.  I will notify her of results.  AVS handout given and reviewed with patient. Pt educated on red flags and when to seek treatment back in ER or UC.

## 2022-02-22 DIAGNOSIS — R39.15 URINARY URGENCY: ICD-10-CM

## 2022-02-22 DIAGNOSIS — R30.0 DYSURIA: ICD-10-CM

## 2022-02-22 DIAGNOSIS — R35.0 URINARY FREQUENCY: ICD-10-CM

## 2022-02-22 ASSESSMENT — ENCOUNTER SYMPTOMS
FALLS: 0
FEVER: 0
FLANK PAIN: 0
ABDOMINAL PAIN: 0
VOMITING: 0
HEADACHES: 0
CHILLS: 0
MYALGIAS: 0
DIARRHEA: 0
NECK PAIN: 0
BACK PAIN: 0
NAUSEA: 0

## 2022-02-23 ENCOUNTER — HOSPITAL ENCOUNTER (OUTPATIENT)
Facility: MEDICAL CENTER | Age: 52
End: 2022-02-23
Attending: NURSE PRACTITIONER
Payer: COMMERCIAL

## 2022-02-23 DIAGNOSIS — L29.0 ANAL ITCHING: ICD-10-CM

## 2022-02-23 PROCEDURE — 87328 CRYPTOSPORIDIUM AG IA: CPT

## 2022-02-23 PROCEDURE — 87329 GIARDIA AG IA: CPT

## 2022-02-24 LAB
BACTERIA UR CULT: ABNORMAL
BACTERIA UR CULT: ABNORMAL
G LAMBLIA+C PARVUM AG STL QL RAPID: NORMAL
SIGNIFICANT IND 70042: ABNORMAL
SIGNIFICANT IND 70042: NORMAL
SITE SITE: ABNORMAL
SITE SITE: NORMAL
SOURCE SOURCE: ABNORMAL
SOURCE SOURCE: NORMAL

## 2022-02-27 ENCOUNTER — TELEPHONE (OUTPATIENT)
Dept: SCHEDULING | Facility: IMAGING CENTER | Age: 52
End: 2022-02-27
Payer: COMMERCIAL

## 2022-03-15 ENCOUNTER — OFFICE VISIT (OUTPATIENT)
Dept: MEDICAL GROUP | Age: 52
End: 2022-03-15
Payer: COMMERCIAL

## 2022-03-15 VITALS
HEART RATE: 68 BPM | DIASTOLIC BLOOD PRESSURE: 60 MMHG | SYSTOLIC BLOOD PRESSURE: 100 MMHG | OXYGEN SATURATION: 95 % | TEMPERATURE: 97.4 F | WEIGHT: 163.8 LBS | BODY MASS INDEX: 29.02 KG/M2 | HEIGHT: 63 IN

## 2022-03-15 DIAGNOSIS — H53.8 BLURRED VISION, BILATERAL: ICD-10-CM

## 2022-03-15 DIAGNOSIS — R42 DIZZINESS: ICD-10-CM

## 2022-03-15 DIAGNOSIS — R51.9 CHRONIC NONINTRACTABLE HEADACHE, UNSPECIFIED HEADACHE TYPE: ICD-10-CM

## 2022-03-15 DIAGNOSIS — Z76.89 ESTABLISHING CARE WITH NEW DOCTOR, ENCOUNTER FOR: ICD-10-CM

## 2022-03-15 DIAGNOSIS — G89.29 CHRONIC NONINTRACTABLE HEADACHE, UNSPECIFIED HEADACHE TYPE: ICD-10-CM

## 2022-03-15 DIAGNOSIS — Z00.00 BLOOD TESTS FOR ROUTINE GENERAL PHYSICAL EXAMINATION: ICD-10-CM

## 2022-03-15 DIAGNOSIS — Z23 NEED FOR VACCINATION: ICD-10-CM

## 2022-03-15 DIAGNOSIS — R20.0 NUMBNESS AND TINGLING: ICD-10-CM

## 2022-03-15 DIAGNOSIS — R20.2 NUMBNESS AND TINGLING: ICD-10-CM

## 2022-03-15 PROCEDURE — 99214 OFFICE O/P EST MOD 30 MIN: CPT | Mod: 25 | Performed by: PHYSICIAN ASSISTANT

## 2022-03-15 PROCEDURE — 90471 IMMUNIZATION ADMIN: CPT | Performed by: PHYSICIAN ASSISTANT

## 2022-03-15 PROCEDURE — 90715 TDAP VACCINE 7 YRS/> IM: CPT | Performed by: PHYSICIAN ASSISTANT

## 2022-03-15 ASSESSMENT — PATIENT HEALTH QUESTIONNAIRE - PHQ9: CLINICAL INTERPRETATION OF PHQ2 SCORE: 0

## 2022-03-15 ASSESSMENT — FIBROSIS 4 INDEX: FIB4 SCORE: 0.54

## 2022-03-15 NOTE — PROGRESS NOTES
"cc: Establish care and concerns for MS    Subjective:     HPI  Joy Staples is a 51 y.o. female presenting to [facility] care.  Is been a few years and she has been seen by primary care provider, has mostly been followed by her gynecologist.  She has history of hysterectomy.  Is up-to-date on mammogram and colonoscopy.  She owns a flower shop.    She has history of craniotomy in 2015 for colloid cyst of the brain.  Her brother passed away from glioblastoma at the age of 38.  She states that since she had the cyst removed she has had intermittent blurred vision and double vision.  Also has intermittent dizziness, does not onset with positional changes, feels more lightheaded-has associated nausea with the dizziness.  Notices this that is exacerbated if she does not sleep well.  She is also extremely fatigued throughout the day, even if she sleeps well.  She has intermittent numbness and tingling of bilateral lower extremities.  Does not complain of back pain.  She also has almost daily headaches.  Mostly located in the right frontal region.  Is relieved with ibuprofen.  She has been to neurology almost 2 years ago-no definitive diagnosis was made-per patient.  She was also seen by neuro-ophthalmologist, per patient exam was normal.  She would like to have this reevaluated again.  Denies weakness, slurred speech, eye pain, mental status changes.      Review of systems:  See above.       Current Outpatient Medications:   •  estradiol (CLIMARA) 0.05 MG/24HR PATCH WEEKLY, Place 1 Patch on the skin every 7 days. On Sunday, Disp: , Rfl: 8    Allergies, past medical history, past surgical history, family history, social history reviewed and updated    Objective:     Vitals: /60 (BP Location: Left arm, Patient Position: Sitting, BP Cuff Size: Adult)   Pulse 68   Temp 36.3 °C (97.4 °F) (Temporal)   Ht 1.6 m (5' 3\")   Wt 74.3 kg (163 lb 12.8 oz)   LMP 12/14/2018   SpO2 95%   BMI 29.02 kg/m²   General: Alert, " pleasant, NAD  HEENT: Normocephalic. Neck supple.  No thyromegaly or masses palpated. No cervical or supraclavicular lymphadenopathy. No carotid bruits   Heart: Regular rate and rhythm.  S1 and S2 normal.  No murmurs appreciated.  Respiratory: Normal respiratory effort.  Clear to auscultation bilaterally.  Skin: Warm, dry, no rashes.  Neurological: No tremors, sensation grossly intact, patellar and biceps reflexes 2+ symmetric, tone/strength normal, gait is normal, rapid movements normal, finger-to-nose intact, heel-knee-shin intact, CN2-12 intact, no pronator drift, romberg negative, no clonus   Extremities: No leg edema.  Radial pulses 2+ symmetric  Psych:  Affect/mood is normal, judgement is good, memory is intact, grooming is appropriate.    Assessment/Plan:     Joy was seen today for establish care and requesting labs.    Diagnoses and all orders for this visit:    Chronic nonintractable headache, unspecified headache type  -Symptoms have been ongoing since her colloid resection.  Headaches have started to become more frequent, with her brothers history of glioblastoma, increase in frequency of headaches will obtain MRI of the brain-also cervical spine.  Will place referral to neurology for further evaluation and additional testing if deemed appropriate.  -     MR-CERVICAL SPINE-WITH & W/O; Future  -     MR-BRAIN-WITH & W/O; Future  -     Referral to Neurology    Numbness and tingling  -Chronic.  See above  -     MR-CERVICAL SPINE-WITH & W/O; Future  -     MR-BRAIN-WITH & W/O; Future  -     Referral to Neurology    Dizziness  -Chronic.  See above  -     MR-CERVICAL SPINE-WITH & W/O; Future  -     MR-BRAIN-WITH & W/O; Future  -     Referral to Neurology    Blurred vision, bilateral  -Chronic.  See above  -     MR-CERVICAL SPINE-WITH & W/O; Future  -     MR-BRAIN-WITH & W/O; Future  -     Referral to Neurology    Blood tests for routine general physical examination  -     CBC WITH DIFFERENTIAL; Future  -      Comp Metabolic Panel; Future  -     Lipid Profile; Future  -     TSH WITH REFLEX TO FT4; Future    Establishing care with new doctor, encounter for  -We will request records and review when received    Need for vaccination  -Immunization given in clinic today.  -     Tdap Vaccine =>8YO IM    My total time spent caring for the patient on the day of the encounter was 35 minutes.   This does not include time spent on separately billable procedures/tests.      Return in about 6 weeks (around 4/26/2022) for Lab Review.

## 2022-03-15 NOTE — LETTER
[facility]  Maria Fernanda Hicks P.A.-C.  [address]  [place] [address]  Fax: [phone]   Authorization for Release/Disclosure of   Protected Health Information   Name: JOE CORTEZ : 1970 SSN: [id number]   Address: [address]  [address]  [place] [address] Phone:    [phone] (home)    I authorize the entity listed below to release/disclose the PHI below to:   [facility]/ Maria Fernanda Hicks P.A.-C.   Provider or Entity Name:  [facility]     Address   City, State, Zip   [facility] [place]   Phone:      Fax:     Reason for request: continuity of care   Information to be released:    [XX] LAST COLONOSCOPY,  including any PATH REPORT and follow-up  [  ] LAST FIT/COLOGUARD RESULT [  ] LAST DEXA  [  ] LAST MAMMOGRAM  [  ] LAST PAP  [  ] LAST LABS [  ] RETINA EXAM REPORT  [  ] IMMUNIZATION RECORDS  [XX] Release all info      [  ] Check here and initial the line next to each item to release ALL health information INCLUDING  _____ Care and treatment for drug and / or alcohol abuse  _____ HIV testing, infection status, or AIDS  _____ Genetic Testing    DATES OF SERVICE OR TIME PERIOD TO BE DISCLOSED: _____________  I understand and acknowledge that:  * This Authorization may be revoked at any time by you in writing, except if your health information has already been used or disclosed.  * Your health information that will be used or disclosed as a result of you signing this authorization could be re-disclosed by the recipient. If this occurs, your re-disclosed health information may no longer be protected by State or Federal laws.  * You may refuse to sign this Authorization. Your refusal will not affect your ability to obtain treatment.  * This Authorization becomes effective upon signing and will  on (date) __________.      If no date is indicated, this Authorization will  one (1) year from the signature date.    Name: Joe Cortez    Signature:   Date:     3/15/2022       PLEASE  FAX REQUESTED RECORDS BACK TO: [phone]

## 2022-03-15 NOTE — LETTER
[facility]  Maria Fernanda Hicks P.A.-C.  [address]  [place] [address]  Fax: [phone]   Authorization for Release/Disclosure of   Protected Health Information   Name: JOE CORTEZ : 1970 SSN: [id number]   Address: [address]  [address]  [place] [address] Phone:    [phone] (home)    I authorize the entity listed below to release/disclose the PHI below to:   [facility]/ Maria Fernanda Hicks P.A.-C.   Provider or Entity Name:  [facility]     Address   City, State, Zip  [facility] [place]   Phone:      Fax:     Reason for request: continuity of care   Information to be released:    [  ] LAST COLONOSCOPY,  including any PATH REPORT and follow-up  [  ] LAST FIT/COLOGUARD RESULT [  ] LAST DEXA  [  ] LAST MAMMOGRAM  [XX] LAST PAP  [XX] LAST LABS [  ] RETINA EXAM REPORT  [  ] IMMUNIZATION RECORDS  [XX] Release all info      [  ] Check here and initial the line next to each item to release ALL health information INCLUDING  _____ Care and treatment for drug and / or alcohol abuse  _____ HIV testing, infection status, or AIDS  _____ Genetic Testing    DATES OF SERVICE OR TIME PERIOD TO BE DISCLOSED: _____________  I understand and acknowledge that:  * This Authorization may be revoked at any time by you in writing, except if your health information has already been used or disclosed.  * Your health information that will be used or disclosed as a result of you signing this authorization could be re-disclosed by the recipient. If this occurs, your re-disclosed health information may no longer be protected by State or Federal laws.  * You may refuse to sign this Authorization. Your refusal will not affect your ability to obtain treatment.  * This Authorization becomes effective upon signing and will  on (date) __________.      If no date is indicated, this Authorization will  one (1) year from the signature date.    Name: Joe Cortez    Signature:   Date:     3/15/2022       PLEASE FAX  REQUESTED RECORDS BACK TO: [phone]

## 2022-03-30 ENCOUNTER — HOSPITAL ENCOUNTER (OUTPATIENT)
Dept: RADIOLOGY | Facility: MEDICAL CENTER | Age: 52
End: 2022-03-30
Attending: PHYSICIAN ASSISTANT
Payer: COMMERCIAL

## 2022-03-30 DIAGNOSIS — R51.9 CHRONIC NONINTRACTABLE HEADACHE, UNSPECIFIED HEADACHE TYPE: ICD-10-CM

## 2022-03-30 DIAGNOSIS — R42 DIZZINESS: ICD-10-CM

## 2022-03-30 DIAGNOSIS — R20.0 NUMBNESS AND TINGLING: ICD-10-CM

## 2022-03-30 DIAGNOSIS — G89.29 CHRONIC NONINTRACTABLE HEADACHE, UNSPECIFIED HEADACHE TYPE: ICD-10-CM

## 2022-03-30 DIAGNOSIS — H53.8 BLURRED VISION, BILATERAL: ICD-10-CM

## 2022-03-30 DIAGNOSIS — R20.2 NUMBNESS AND TINGLING: ICD-10-CM

## 2022-03-30 PROCEDURE — A9576 INJ PROHANCE MULTIPACK: HCPCS | Performed by: PHYSICIAN ASSISTANT

## 2022-03-30 PROCEDURE — 700117 HCHG RX CONTRAST REV CODE 255: Performed by: PHYSICIAN ASSISTANT

## 2022-03-30 PROCEDURE — 72156 MRI NECK SPINE W/O & W/DYE: CPT

## 2022-03-30 PROCEDURE — 70553 MRI BRAIN STEM W/O & W/DYE: CPT

## 2022-03-30 RX ADMIN — GADOTERIDOL 15 ML: 279.3 INJECTION, SOLUTION INTRAVENOUS at 10:13

## 2022-03-31 ENCOUNTER — OFFICE VISIT (OUTPATIENT)
Dept: NEUROLOGY | Facility: MEDICAL CENTER | Age: 52
End: 2022-03-31
Attending: PSYCHIATRY & NEUROLOGY
Payer: COMMERCIAL

## 2022-03-31 VITALS
TEMPERATURE: 98.6 F | HEIGHT: 64 IN | BODY MASS INDEX: 28.38 KG/M2 | RESPIRATION RATE: 15 BRPM | OXYGEN SATURATION: 94 % | DIASTOLIC BLOOD PRESSURE: 60 MMHG | WEIGHT: 166.23 LBS | SYSTOLIC BLOOD PRESSURE: 92 MMHG | HEART RATE: 79 BPM

## 2022-03-31 DIAGNOSIS — H53.10 SUBJECTIVE VISUAL DISTURBANCE OF BOTH EYES: ICD-10-CM

## 2022-03-31 DIAGNOSIS — F41.1 GENERALIZED ANXIETY DISORDER: ICD-10-CM

## 2022-03-31 DIAGNOSIS — G44.229 CHRONIC TENSION-TYPE HEADACHE, NOT INTRACTABLE: ICD-10-CM

## 2022-03-31 DIAGNOSIS — F43.89 STRESS REACTION, CHRONIC: ICD-10-CM

## 2022-03-31 PROCEDURE — 99204 OFFICE O/P NEW MOD 45 MIN: CPT | Performed by: PSYCHIATRY & NEUROLOGY

## 2022-03-31 PROCEDURE — 99211 OFF/OP EST MAY X REQ PHY/QHP: CPT | Performed by: PSYCHIATRY & NEUROLOGY

## 2022-03-31 RX ORDER — COVID-19 MOLECULAR TEST ASSAY
KIT MISCELLANEOUS
COMMUNITY
Start: 2022-03-29 | End: 2022-06-28

## 2022-03-31 ASSESSMENT — ANXIETY QUESTIONNAIRES
4. TROUBLE RELAXING: MORE THAN HALF THE DAYS
IF YOU CHECKED OFF ANY PROBLEMS ON THIS QUESTIONNAIRE, HOW DIFFICULT HAVE THESE PROBLEMS MADE IT FOR YOU TO DO YOUR WORK, TAKE CARE OF THINGS AT HOME, OR GET ALONG WITH OTHER PEOPLE: NOT DIFFICULT AT ALL
3. WORRYING TOO MUCH ABOUT DIFFERENT THINGS: NEARLY EVERY DAY
6. BECOMING EASILY ANNOYED OR IRRITABLE: SEVERAL DAYS
1. FEELING NERVOUS, ANXIOUS, OR ON EDGE: SEVERAL DAYS
7. FEELING AFRAID AS IF SOMETHING AWFUL MIGHT HAPPEN: NEARLY EVERY DAY
2. NOT BEING ABLE TO STOP OR CONTROL WORRYING: MORE THAN HALF THE DAYS
GAD7 TOTAL SCORE: 14
5. BEING SO RESTLESS THAT IT IS HARD TO SIT STILL: MORE THAN HALF THE DAYS

## 2022-03-31 ASSESSMENT — PATIENT HEALTH QUESTIONNAIRE - PHQ9: CLINICAL INTERPRETATION OF PHQ2 SCORE: 0

## 2022-03-31 ASSESSMENT — FIBROSIS 4 INDEX: FIB4 SCORE: 0.55

## 2022-03-31 NOTE — PROGRESS NOTES
"Reason for Consult:   Headache(s)- chronic, subjective stress, 2 years of a daily visual disturbance.    History of present illness:    Joy Staples 52 y.o. right handed who is originally from [place] and came to the  at age 17 and then to [place] and moved to [place] in 1996 and moved to [place] in 1998. She is a  and was a TV .    She had a colloid cyst removed in 2005 at [facility]. She recalls back around that time she had an allergic to shrimp and she awoke with swelling of her eyes and shortness of her breath. She was offered a Brain MRI when at [facility]. She was found to have a colloid cyst surgery.    She describes having headache(s) since age 8-10 years old- she even recalls  Her head hurting so bad that her grandmother would \"push against my head may 2 times.\"     Typically the headaches would be in the bi frontal area and behind the eyes with light sensitivity (sunlight,brigh lights when driving) but no sound sensitivities nor smell sensitivities but no nausea or emesis.    Stress or when has a pressure (from work a situation that she can not handle)> this will trigger similar headaches.  We talked about her stressors- one of her son's was diagnosed with schizophrenia and her other son diagnosed with Type 1 diabetes and went through a divorce 2-3 years ago.  On a daily basis she describes her level of subjective stress has been 8/10 level.  She describes taking \"everything so seriously\" for much of her life.    She was diagnosed with anxiety about 4-5 years ago> she agrees to this diagnosis including her prior stressful job and the people she used to work with gave her \"crap\" and they caused excessive stress.      Typical headache can last all day and takes (Advil- 2 per headache) which usually helps a lot.    She described being motion sick on the bus and in car when she was younger.    She had a recent eye exam in the last 6 months or so- was told she had 20/20 vision.     She " has described for the last 2 years without any changes in her vision over the last 6-12 months. She describes having period a slow movement of her vision to the left and then back to the midline and there is no nature to this symptom. This can occur 2-3 hours later and random. This is not painful to her.     She has not had any sensory disturbances (numbness,tingling, neuropathic pains ) of any part of her body in the last 6 months or so.    She has no not had any involuntary movements of her body in the last 3-6 months.    She has not described orthostatic related dizziness,faintness upon standing or soon after standing in the last 3-6 months or so.    Weight has been stable in the last 12 months.    No double vision in the recent years.    Patient Active Problem List    Diagnosis Date Noted   • Obesity (BMI 30-39.9) 06/26/2018   • Anxiety 12/12/2017   • Constipation 12/12/2017   • Diarrhea 07/29/2016   • Deviated septum 07/29/2016       Past medical history:   Past Medical History:   Diagnosis Date   • BPPV (benign paroxysmal positional vertigo) 7/29/2016   • Colloid cyst of brain (HCC)     s/p resection 2005       Past surgical history:   Past Surgical History:   Procedure Laterality Date   • [place] LAP,APPENDECTOMY  5/16/2021    Procedure: APPENDECTOMY, LAPAROSCOPIC;  Surgeon: Hank Magana M.D.;  Location: SURGERY [facility];  Service: General   • APPENDECTOMY  05/16/2021   • ABDOMINAL HYSTERECTOMY TOTAL      total   • CHOLECYSTECTOMY     • CRANIOTOMY           Social history:   Social History     Socioeconomic History   • Marital status: Single     Spouse name: Not on file   • Number of children: Not on file   • Years of education: Not on file   • Highest education level: Not on file   Occupational History   • Not on file   Tobacco Use   • Smoking status: Never Smoker   • Smokeless tobacco: Never Used   Vaping Use   • Vaping Use: Never used   Substance and Sexual Activity   • Alcohol use: Never     Comment:  "Occasionally   • Drug use: Never   • Sexual activity: Yes   Other Topics Concern   • Not on file   Social History Narrative    ** Merged History Encounter **          Social Determinants of Health     Financial Resource Strain: Not on file   Food Insecurity: Not on file   Transportation Needs: Not on file   Physical Activity: Not on file   Stress: Not on file   Social Connections: Not on file   Intimate Partner Violence: Not on file   Housing Stability: Not on file       Family history:   Family History   Problem Relation Age of Onset   • No Known Problems Mother    • Heart Disease Father    • Diabetes Brother         type 1   • Cancer Brother 38        Gleoblastoma   • Rheumatologic Disease Sister         RA   • Diabetes Son         type 1   • Schizophrenia Son          Current medications:   Current Outpatient Medications   Medication   • estradiol (CLIMARA) 0.05 MG/24HR PATCH WEEKLY     No current facility-administered medications for this visit.       Medication Allergy:  No Known Allergies        Physical examination:   Vitals:    03/31/22 1527   BP: (!) 92/60   BP Location: Right arm   Patient Position: Sitting   BP Cuff Size: Adult   Pulse: 79   Resp: 15   Temp: 37 °C (98.6 °F)   TempSrc: Temporal   SpO2: 94%   Weight: 75.4 kg (166 lb 3.6 oz)   Height: 1.626 m (5' 4\")       Normal Cephalic Atraumatic.  General: Full Range of Movement around the Neck in all directions without restrictions or discrete pain evoked triggers.  No lower extremity edema.      Neurological  Exam:    Mental status: Awake, alert and fully oriented to person, place, time and situation. Normal attention, concentration and fund of knowledge for education level.  Did not appear/act combative,irritable,anxious,paranoid/delusional or aggressive to or with me.  Speech and language: Speech is fluent without errors, clear, intact to repetition and intact to naming.     Follows 3 step motor commands in sequence without significant delay and " correctly.    Cranial nerve exam:  II: Pupils are equally round and reactive to light. Visual fields are intact by confrontation.  III, IV, VI: EOMI, no diplopia, no ptosis.  20/25 Snellen Card bilaterally.  No visual field cuts to confrontation or finger counting.  No APD  V: Sensation to light touch is normal over V1-3 distributions bilaterally.  .  VII: Facial movements are symmetrical. There is no facial droop. .  VIII: Hearing intact to soft speech and finger rub bilaterally  IX: Palate elevates symmetrically, uvula is midline. Dysarthria is not present.  XI: Shoulder shrug are symmetrical and strong.   XII: Tongue protrudes midline.        Motor exam:  Muscle tone is normal in all 4 limbs. and No abnormal movements appreciated.    Muscle strength:    Neck Flexors/Extensors: 5/5       Right  Left  Deltoid   5/5  5/5      Biceps   5/5  5/5  Triceps  5/5  5/5   Wrist extensors 5/5  5/5  Wrist flexors  5/5  5/5     5/5  5/5  Interossei  5/5  5/5  Thenar (APB)  5/5  5/5   Hip flexors  5/5  5/5  Quadriceps  5/5  5/5    Hamstrings  5/5  5/5  Dorsiflexors  5/5  5/5  Plantarflexors  5/5  5/5  Toe extension  5/5  5/5  Toe Flexors                5/5                   5/5        Reflexes:       Right  Left  Biceps   2/4  2/4  Triceps  2/4  2/4  Brachioradialis 2/4  2/4  Knee jerk  2/4  2/4  Ankle jerk  2/4  2/4     Frontal release signs are absent.    bilaterally toes are downgoing to plantar stimulation..    Coordination (finger-to-nose, heel/knee/shin, rapid alternating movements) was normal.     There was no truncal ataxia, no tremors, and no dysmetria.     Station and gait - easily stands up from exam chair without retropulsion,veering,leaning,swaying (to either side).   Arm swing symmetrical.    No Rombergism.      Labs and Tests:  3/15/2022: blood work ordered by [facility]-     3/30/2022 9:36 AM     HISTORY/REASON FOR EXAM:  Myelopathy, acute or progressive        TECHNIQUE/EXAM  DESCRIPTION: MRI of the cervical spine without and with contrast.     The study was performed on a [employer] 1.5 Norma MRI scanner. T1 sagittal, T2 fast spin-echo sagittal, T1 postcontrast fat-suppressed sagittal, and gradient-echo axial images were obtained of the cervical spine. 15 mL ProHance contrast were administered   intravenously.     COMPARISON:  MRI cervical spine from 3/1/2009 is not available at this time.     FINDINGS:  The cervical vertebral bodies have a normal height and alignment. The disk spaces are well-maintained and have a normal appearance.  The cervical cord has a normal caliber course and signal intensity. There is no evidence of abnormal enhancement in the cervical cord or spine.  No area of abnormal enhancement is seen.     Findings specific to each level are described below:  C2-3: Normal  C3-4:  Normal  C4-5:  Normal  C5-6: Minimal endplate disc osteophyte complex without significant encroachment upon the spinal canal or neural foramina.  C6-7: Normal  C7-T1: Normal     Postcontrast images through the cervical spine do not demonstrate any abnormal enhancement.     IMPRESSION:        1.  Minimal cervical spine degenerative changes at C5-6 without canal stenosis or foraminal narrowing.    Brain MRI-     HISTORY/REASON FOR EXAM: Headache, chronic, no new features; Dizziness, non-specific        TECHNIQUE/EXAM DESCRIPTION:     T1 sagittal, T2 axial, flair coronal, T1 coronal, and diffusion-weighted axial images were obtained of the brain pre-contrast followed by T1 coronal and axial images post intravenous administration of 15 mL ProHance.     COMPARISON: 12/4/2007     FINDINGS:     Diffusion-weighted images are within normal limits. No acute infarct is identified.  Gradient echo images do not demonstrate any evidence of intracranial hemorrhage.  Postoperative changes noted in the left frontal region with encephalomalacia along the left frontal white matter and along the margins of the  operative tract that leads to the left lateral ventricle. A small defect is noted in the left aspect of the body   of the corpus callosum corresponding to the site of surgery. Minimal transcallosal synaptic degeneration is also noted along the right aspect of the body of the corpus callosum at the level of the surgical defect.  No residual or recurrent lesion is identified at the foramen of Monro.  Postcontrast images through the brain do not demonstrate any abnormal intracranial enhancement.     Bone marrow signal in the calvarium is within normal limits.  Included portions of the paranasal sinuses are within normal limits.  Included portions of the mastoid air cells are within normal limits.  Included portions of the orbits are within normal limits     IMPRESSION:        Postoperative changes are noted in the left frontal region with a surgical defect that communicates with the left lateral ventricle. Mild gliosis is noted along the margins of the surgical tract. Focal volume loss is also noted in the anterior body of   corpus callosum at the site of the surgical defect. No evidence of recurrent lesion.     There is no abnormal intracranial contrast enhancement.             Exam Ended: 03/30/22 10:52 AM Last Resulted: 03/30/22  2:51 PM                NEUROIMAGING:     Brain MRI 2009- postoperative change with surrounding gliosis in the left posterior-superior frontal lobe coursing into the left frontal horn. No evidence of recurrence or residual neoplasm and stable compared to 2007.        Impression/Plans/Recommendations:    1. Visual Disturbance x 2 years- daily.  She has noticed this when staring at something for a period of time.  This disturbance does seem to come and go.  This disturbance is not provoked by valsalva maneuvers or provoked by moving her eyes in any direction.    Staring at my hand or finger did not induce or seem to cause my hand or finger to move whether both eyes were covered or each eye was  covered individually.    I do not appreciate any nystagmus on exam.    I have reviewed her Brain MRI and she no evidence of abnormalities in the brainstem regions or generally other than expected for post surgical changes.    2. Situational Stress (chronic)- I discussed this issue with Joy at length today.    GAD7 screen of 14    There are multiple stressors in her life in the last 2-3 years (family  Health and person related)- average subjective stress level of 8/10 in the recent months.    3. Tension type headaches- no evidence for increased ICP symptoms otherwise.     No features historically to support migraine(s)- ie, lacking GI symptoms or clear cut sensitivities during or around  Headaches.    At this point do not feel CSF analysis or manometry needed.    Plans:    A. Would like opinion from one of my Neuro Eye MD colleagues about #1 issue as I am at a loss to explain this persisting and daily symptom.    B. Referral to Psychology for stress management techniques which I hope to reduce her headaches and subjective stress levels.    The patient understands and agrees that due to the complexity of his/her diagnosis, results of any testing and further recommendations will typically be discussed/made during a face to face encounter in my office and for simpler  matters either by a phone call or email correspondence. The patient and/or family further understands it is their responsibility to keep proper follow up as needed and when suggested by me.      I have performed  a history and physical exam and a directed /focused  ROS today.    Total time spent today or this patient's care was 43  minute and included reviewing diagnostic workup to date (labs and imaging that include iand giving advise and suggestions on the present neurological problem and  documenting the clinical information in the EMR.    Follow up PRN at this time.    Duane Arce MD  [facility].   [facility]

## 2022-05-17 ENCOUNTER — OFFICE VISIT (OUTPATIENT)
Dept: MEDICAL GROUP | Age: 52
End: 2022-05-17
Payer: COMMERCIAL

## 2022-05-17 VITALS
HEART RATE: 76 BPM | SYSTOLIC BLOOD PRESSURE: 102 MMHG | DIASTOLIC BLOOD PRESSURE: 76 MMHG | BODY MASS INDEX: 28.58 KG/M2 | RESPIRATION RATE: 16 BRPM | WEIGHT: 167.4 LBS | TEMPERATURE: 97.6 F | HEIGHT: 64 IN | OXYGEN SATURATION: 97 %

## 2022-05-17 DIAGNOSIS — G89.29 CHRONIC NONINTRACTABLE HEADACHE, UNSPECIFIED HEADACHE TYPE: ICD-10-CM

## 2022-05-17 DIAGNOSIS — R51.9 CHRONIC NONINTRACTABLE HEADACHE, UNSPECIFIED HEADACHE TYPE: ICD-10-CM

## 2022-05-17 DIAGNOSIS — H53.8 BLURRED VISION, BILATERAL: ICD-10-CM

## 2022-05-17 DIAGNOSIS — F41.1 GAD (GENERALIZED ANXIETY DISORDER): ICD-10-CM

## 2022-05-17 PROCEDURE — 99214 OFFICE O/P EST MOD 30 MIN: CPT | Performed by: PHYSICIAN ASSISTANT

## 2022-05-17 RX ORDER — VENLAFAXINE HYDROCHLORIDE 37.5 MG/1
37.5 CAPSULE, EXTENDED RELEASE ORAL DAILY
Qty: 30 CAPSULE | Refills: 3 | Status: SHIPPED | OUTPATIENT
Start: 2022-05-17 | End: 2022-06-28 | Stop reason: SDUPTHER

## 2022-05-17 ASSESSMENT — FIBROSIS 4 INDEX: FIB4 SCORE: 0.55

## 2022-05-17 NOTE — PROGRESS NOTES
"cc: Anxiety/follow-up neuro appointment    Subjective:     HPI  Joy Staples is a 52 y.o. female presenting for follow-up and her appointment.  She has been having daily headaches, nonspecific visual changes, after having colloid cyst removed in 2005.  Since then she has had continued symptoms.  MRI of the brain was unremarkable.  For the visual changes her neurologist referred her to a neuro-ophthalmologist.  She has not been contacted by them yet.  He also place referral to psychology for stress management.      In further discussion she does have a moderate amount of daily anxiety.  Rates it 9/10 on a daily basis.  She does feel overwhelmed.  She is not able to sleep well at night, this in turn she is fatigued throughout the day.  She does not feel she is managing her stress well.  She has been on medications in the past, but is unsure what they were.  Would be open to trialing medications.  Denies SI or HI.        Review of systems:  See above.       Current Outpatient Medications:   •  venlafaxine XR (EFFEXOR XR) 37.5 MG CAPSULE SR 24 HR, Take 1 Capsule by mouth every day., Disp: 30 Capsule, Rfl: 3  •  BINAXNOW COVID-19 AG HOME TEST Kit, TEST AS DIRECTED TODAY, Disp: , Rfl:   •  estradiol (CLIMARA) 0.1 MG/24HR PATCH WEEKLY, APPLY 1 PATCH TO SKIN TRANSDERMAL ONCE WEEKLY, Disp: , Rfl:     Allergies, past medical history, past surgical history, family history, social history reviewed and updated    Objective:     Vitals: /76 (BP Location: Left arm, Patient Position: Sitting, BP Cuff Size: Adult)   Pulse 76   Temp 36.4 °C (97.6 °F) (Temporal)   Resp 16   Ht 1.626 m (5' 4\")   Wt 75.9 kg (167 lb 6.4 oz)   LMP 12/14/2018   SpO2 97%   BMI 28.73 kg/m²   General: Alert, pleasant, NAD  HEENT: Normocephalic. Neck supple.  No thyromegaly or masses palpated. No cervical or supraclavicular lymphadenopathy. No carotid bruits   Heart: Regular rate and rhythm.  S1 and S2 normal.  No murmurs " appreciated.  Respiratory: Normal respiratory effort.  Clear to auscultation bilaterally.  Skin: Warm, dry, no rashes.  Extremities: No leg edema.  Radial pulses 2+ symmetric  Psych:  Affect/mood is normal, judgement is good, memory is intact, grooming is appropriate.    Assessment/Plan:     Joy was seen today for follow-up.    Diagnoses and all orders for this visit:    ABI (generalized anxiety disorder)  -Had lengthy discussion with patient that I do believe that she would benefit from medications.  Hopefully if we get better control of her anxiety this in turn will help with her insomnia, fatigue, and maybe even her headaches.  She does also have menopausal symptoms, mostly controlled with estradiol, but if the Effexor controls her menopausal symptoms she would like to taper off of the estradiol patches.  Will start on 37.5 mg of Effexor.  If after 2 weeks she is tolerating the medication well but has not noted any improvement can increase to 2 tabs-75 mg.  Medication side effects reviewed.  We will follow-up in 4 weeks.  Number given to psychology and advised to call and schedule an appointment.  She does have lab orders.  Advised to have these completed prior to her next visit  -     venlafaxine XR (EFFEXOR XR) 37.5 MG CAPSULE SR 24 HR; Take 1 Capsule by mouth every day.    Chronic nonintractable headache, unspecified headache type  -Per neurology note, not migrainous in nature.  Possibly more stress related.  Hopefully with starting the Effexor this will in turn decrease the headaches.    Blurred vision, bilateral  -She has been referred to neuro-ophthalmology by her neurologist.  Number given advised to call and schedule an appointment    My total time spent caring for the patient on the day of the encounter was 30 minutes.   This does not include time spent on separately billable procedures/tests.     Return in about 4 weeks (around 6/14/2022) for Anxiety, Medication Check, Lab Review.

## 2022-06-28 ENCOUNTER — OFFICE VISIT (OUTPATIENT)
Dept: MEDICAL GROUP | Age: 52
End: 2022-06-28
Payer: COMMERCIAL

## 2022-06-28 VITALS
RESPIRATION RATE: 16 BRPM | OXYGEN SATURATION: 96 % | HEIGHT: 64 IN | DIASTOLIC BLOOD PRESSURE: 70 MMHG | TEMPERATURE: 97.8 F | BODY MASS INDEX: 28.17 KG/M2 | HEART RATE: 68 BPM | WEIGHT: 165 LBS | SYSTOLIC BLOOD PRESSURE: 96 MMHG

## 2022-06-28 DIAGNOSIS — F41.1 GAD (GENERALIZED ANXIETY DISORDER): ICD-10-CM

## 2022-06-28 DIAGNOSIS — R00.2 PALPITATIONS: ICD-10-CM

## 2022-06-28 PROCEDURE — 99214 OFFICE O/P EST MOD 30 MIN: CPT | Performed by: PHYSICIAN ASSISTANT

## 2022-06-28 PROCEDURE — 93000 ELECTROCARDIOGRAM COMPLETE: CPT | Performed by: PHYSICIAN ASSISTANT

## 2022-06-28 RX ORDER — PROPRANOLOL HYDROCHLORIDE 10 MG/1
10 TABLET ORAL 3 TIMES DAILY PRN
Qty: 30 TABLET | Refills: 1 | Status: SHIPPED | OUTPATIENT
Start: 2022-06-28 | End: 2023-04-17

## 2022-06-28 RX ORDER — VENLAFAXINE HYDROCHLORIDE 37.5 MG/1
37.5 CAPSULE, EXTENDED RELEASE ORAL DAILY
Qty: 30 CAPSULE | Refills: 3 | Status: SHIPPED | OUTPATIENT
Start: 2022-06-28 | End: 2022-11-10

## 2022-06-28 ASSESSMENT — FIBROSIS 4 INDEX: FIB4 SCORE: 0.55

## 2022-06-28 NOTE — PROGRESS NOTES
"cc: Follow-up anxiety and palpitations    Subjective:     HPI  Joy Staples is a 52 y.o. female presenting for follow-up anxiety.  Last visit we discussed starting Effexor.  She started having intermittent palpitations and was afraid to start the Effexor.  In further discussion she rates her anxiety level at 7-8/10.  She has been trying to take it a little bit more \" easy\" and trying to not stress out as much of her things, but still finding that fairly difficult.  She is understanding of the reasoning behind the Effexor and agrees to start the medication.    In the past 4 weeks she has had 3 events of palpitations.  States that they have only occurred at night, it does wake her up out of sleep.  Feels like her heart is racing.  She does get associated shortness of breath, feels midsternal chest pain, this does not radiate.  It does subsequently resolved within 15-20 minutes.  Has not occurred during the day.  Is able to exercise without any chest pain or palpitations or shortness of breath.  She does feel anxious when these onset.  Denies syncope, diaphoresis, nausea, vomiting, lower extremity edema, orthopnea.      Review of systems:  See above.       Current Outpatient Medications:   •  propranolol (INDERAL) 10 MG Tab, Take 1 Tablet by mouth 3 times a day as needed (anxiety)., Disp: 30 Tablet, Rfl: 1  •  venlafaxine XR (EFFEXOR XR) 37.5 MG CAPSULE SR 24 HR, Take 1 Capsule by mouth every day., Disp: 30 Capsule, Rfl: 3  •  estradiol (CLIMARA) 0.1 MG/24HR PATCH WEEKLY, APPLY 1 PATCH TO SKIN TRANSDERMAL ONCE WEEKLY, Disp: , Rfl:     Allergies, past medical history, past surgical history, family history, social history reviewed and updated    Objective:     Vitals: BP (!) 96/70 (BP Location: Left arm, Patient Position: Sitting, BP Cuff Size: Adult)   Pulse 68   Temp 36.6 °C (97.8 °F) (Temporal)   Resp 16   Ht 1.626 m (5' 4\")   Wt 74.8 kg (165 lb)   LMP 12/14/2018   SpO2 96%   BMI 28.32 kg/m²   General: " Alert, pleasant, NAD  HEENT: Normocephalic. Neck supple.  No thyromegaly or masses palpated. No cervical or supraclavicular lymphadenopathy. No carotid bruits   Heart: Regular rate and rhythm.  S1 and S2 normal.  No murmurs appreciated.  Respiratory: Normal respiratory effort.  Clear to auscultation bilaterally.  Skin: Warm, dry, no rashes.  Extremities: No leg edema.  Radial pulses 2+ symmetric  Psych:  Affect/mood is normal, judgement is good, memory is intact, grooming is appropriate.    EKG Interpretation-HR is 62.  First-degree AV block, sinus rhythm, borderline R wave progression-anterior leads. unchanged from previous tracings 5/2021      Assessment/Plan:     Joy was seen today for palpitations and shortness of breath.    Diagnoses and all orders for this visit:    ABI (generalized anxiety disorder)  Her anxiety level is still fairly high.  Again discussed starting Effexor to help bring her anxiety levels down.  She is agreeable.  We will start at 37.5 mg.  If after 2 weeks she is tolerating the medication well, but has not noted any improvement in her anxiety, can increase to 2 tabs- 75 mg.  Also given propanolol to use for abortive therapies.  -     propranolol (INDERAL) 10 MG Tab; Take 1 Tablet by mouth 3 times a day as needed (anxiety).  -     venlafaxine XR (EFFEXOR XR) 37.5 MG CAPSULE SR 24 HR; Take 1 Capsule by mouth every day.    Palpitations  EKG is unremarkable.  She has had 3 events of palpitation, occurring at night only.  Do believe that these are more anxiety related.  However, if they start to increase in frequency, occur during the day or has difficulties with exercise follow-up sooner for reevaluation.  -     EKG  -     propranolol (INDERAL) 10 MG Tab; Take 1 Tablet by mouth 3 times a day as needed (anxiety).        Return in about 4 weeks (around 7/26/2022) for Medication Check, Lab Review.

## 2022-07-26 ENCOUNTER — OFFICE VISIT (OUTPATIENT)
Dept: URGENT CARE | Facility: CLINIC | Age: 52
End: 2022-07-26
Payer: COMMERCIAL

## 2022-07-26 VITALS
TEMPERATURE: 97.8 F | SYSTOLIC BLOOD PRESSURE: 120 MMHG | WEIGHT: 166 LBS | HEIGHT: 64 IN | BODY MASS INDEX: 28.34 KG/M2 | DIASTOLIC BLOOD PRESSURE: 80 MMHG | RESPIRATION RATE: 16 BRPM | OXYGEN SATURATION: 96 % | HEART RATE: 75 BPM

## 2022-07-26 DIAGNOSIS — H53.9 VISUAL DISTURBANCE: ICD-10-CM

## 2022-07-26 DIAGNOSIS — H57.89 EYE REDNESS: ICD-10-CM

## 2022-07-26 DIAGNOSIS — H10.13 ACUTE ATOPIC CONJUNCTIVITIS OF BOTH EYES: ICD-10-CM

## 2022-07-26 DIAGNOSIS — Z86.69 HISTORY OF ITCHING OF EYE: ICD-10-CM

## 2022-07-26 PROCEDURE — 99214 OFFICE O/P EST MOD 30 MIN: CPT | Performed by: NURSE PRACTITIONER

## 2022-07-26 RX ORDER — POLYMYXIN B SULFATE AND TRIMETHOPRIM 1; 10000 MG/ML; [USP'U]/ML
1 SOLUTION OPHTHALMIC EVERY 4 HOURS
Qty: 10 ML | Refills: 0 | Status: SHIPPED | OUTPATIENT
Start: 2022-07-26 | End: 2022-11-10

## 2022-07-26 ASSESSMENT — FIBROSIS 4 INDEX: FIB4 SCORE: 0.55

## 2022-07-26 NOTE — PROGRESS NOTES
"Subjective:   Joy Staples is a 52 y.o. female who presents for Eye Burn ((L) & (R) Eye drainage & burning sensation. X2 weeks ago. Has noticed more blurred vision. Notices double vision horizontally and vertically.)       [facility]  Patient presents for evaluation of an approximate 2-week history of bilateral eye redness, burning, and discharge.  Patient states she had COVID 2 to 3 weeks ago, at which time her eyes begin to bother her.  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  Additionally, patient reports some visual changes, was referred to neuro-ophthalmology in the past, she has not followed up with them yet.    ROS  All other systems are negative except as documented above within [facility].    MEDS:   Current Outpatient Medications:   •  propranolol (INDERAL) 10 MG Tab, Take 1 Tablet by mouth 3 times a day as needed (anxiety)., Disp: 30 Tablet, Rfl: 1  •  venlafaxine XR (EFFEXOR XR) 37.5 MG CAPSULE SR 24 HR, Take 1 Capsule by mouth every day., Disp: 30 Capsule, Rfl: 3  •  estradiol (CLIMARA) 0.1 MG/24HR PATCH WEEKLY, APPLY 1 PATCH TO SKIN TRANSDERMAL ONCE WEEKLY, Disp: , Rfl:   ALLERGIES: No Known Allergies    Patient's PMH, SocHx, SurgHx, FamHx, Drug allergies and medications were reviewed.     Objective:   /80 (BP Location: Right arm, Patient Position: Sitting, BP Cuff Size: Adult)   Pulse 75   Temp 36.6 °C (97.8 °F) (Temporal)   Resp 16   Ht 1.626 m (5' 4\")   Wt 75.3 kg (166 lb)   LMP 12/14/2018   SpO2 96%   BMI 28.49 kg/m²     Physical Exam  Vitals and nursing note reviewed.   Constitutional:       General: She is awake.      Appearance: Normal appearance. She is well-developed.   HENT:      Head: Normocephalic and atraumatic.      Right Ear: External ear normal.      Left Ear: External ear normal.      Nose: Nose normal.      Mouth/Throat:      Mouth: Mucous membranes are moist.      Pharynx: Oropharynx is " clear.   Eyes:      Extraocular Movements: Extraocular movements intact.      Conjunctiva/sclera:      Right eye: Right conjunctiva is injected (mild).      Left eye: Left conjunctiva is injected (mild).   Cardiovascular:      Rate and Rhythm: Normal rate and regular rhythm.   Pulmonary:      Effort: Pulmonary effort is normal.      Breath sounds: Normal breath sounds.   Musculoskeletal:         General: Normal range of motion.      Cervical back: Normal range of motion and neck supple.   Skin:     General: Skin is warm and dry.   Neurological:      Mental Status: She is alert and oriented to person, place, and time.   Psychiatric:         Mood and Affect: Mood normal.         Behavior: Behavior normal.         Thought Content: Thought content normal.         Assessment/Plan:   Assessment    1. Acute atopic conjunctivitis of both eyes  - polymixin-trimethoprim (POLYTRIM) 21661-6.1 UNIT/ML-% Solution; Administer 1 Drop into both eyes every 4 hours.  Dispense: 10 mL; Refill: 0    2. Eye redness    3. History of itching of eye    4. Visual disturbance      Vital signs stable at today's acute urgent care visit.  Begin medications as listed.  Provided information to follow-up with neuro-ophthalmology.  This is an acute problem with uncertain prognosis, medication management and instructions as well as management options were provided.    Advised the patient to follow-up with the primary care provider/urgent care if symptoms persist.  Strict red flags discussed and indications to immediately call 911 or present to the ED. All questions were encouraged and answered to the patient's satisfaction and understanding, and they agree to the plan of care.     I personally reviewed prior external notes and test results pertinent to today and independently reviewed and interpreted all diagnostics ordered during this urgent care acute visit. Time spent evaluating this patient includes preparing for visit, counseling/education, exam,  evaluation, obtaining history, and ordering lab/test/procedures.      Please note that this dictation was created using voice recognition software. I have made a reasonable attempt to correct obvious errors, but I expect that there are errors of grammar and possibly content that I did not discover before finalizing the note.

## 2022-09-16 ENCOUNTER — OFFICE VISIT (OUTPATIENT)
Dept: URGENT CARE | Facility: CLINIC | Age: 52
End: 2022-09-16
Payer: COMMERCIAL

## 2022-09-16 VITALS
DIASTOLIC BLOOD PRESSURE: 72 MMHG | RESPIRATION RATE: 14 BRPM | BODY MASS INDEX: 27.49 KG/M2 | TEMPERATURE: 97.4 F | HEART RATE: 64 BPM | WEIGHT: 161 LBS | HEIGHT: 64 IN | OXYGEN SATURATION: 96 % | SYSTOLIC BLOOD PRESSURE: 104 MMHG

## 2022-09-16 DIAGNOSIS — R07.9 CHEST PAIN, UNSPECIFIED TYPE: ICD-10-CM

## 2022-09-16 DIAGNOSIS — F43.9 SITUATIONAL STRESS: ICD-10-CM

## 2022-09-16 DIAGNOSIS — R00.2 PALPITATIONS: ICD-10-CM

## 2022-09-16 PROCEDURE — 99214 OFFICE O/P EST MOD 30 MIN: CPT | Performed by: NURSE PRACTITIONER

## 2022-09-16 ASSESSMENT — FIBROSIS 4 INDEX: FIB4 SCORE: 0.55

## 2022-09-16 NOTE — PROGRESS NOTES
"Subjective:   Joy Staples is a 52 y.o. female who presents for Chest Pain (X 2 days, sharp pain in chest, right side jaw pain, pain in center of back)       HPI  Patient presents for evaluation of 2-day history of intermittent chest pain and palpitations.  Patient states this began after getting off of a stress of phone call with her ex .  Patient states that she has had intermittent palpitations over the past several months, generally will take deep breaths and able resolve.  Patient also has a history of intermittent right-sided jaw pain, denies any dental pain.  States pain will occur intermittently, is noticed to have increased pain during periods of stress.  Denies family history of early CAD or personal history of cardiac related issues.    ROS  All other systems are negative except as documented above within HPI.    MEDS:   Current Outpatient Medications:     estradiol (CLIMARA) 0.1 MG/24HR PATCH WEEKLY, APPLY 1 PATCH TO SKIN TRANSDERMAL ONCE WEEKLY, Disp: , Rfl:     polymixin-trimethoprim (POLYTRIM) 69497-3.1 UNIT/ML-% Solution, Administer 1 Drop into both eyes every 4 hours. (Patient not taking: Reported on 9/16/2022), Disp: 10 mL, Rfl: 0    propranolol (INDERAL) 10 MG Tab, Take 1 Tablet by mouth 3 times a day as needed (anxiety). (Patient not taking: Reported on 9/16/2022), Disp: 30 Tablet, Rfl: 1    venlafaxine XR (EFFEXOR XR) 37.5 MG CAPSULE SR 24 HR, Take 1 Capsule by mouth every day. (Patient not taking: Reported on 9/16/2022), Disp: 30 Capsule, Rfl: 3  ALLERGIES: No Known Allergies    Patient's PMH, SocHx, SurgHx, FamHx, Drug allergies and medications were reviewed.     Objective:   /72   Pulse 64   Temp 36.3 °C (97.4 °F) (Temporal)   Resp 14   Ht 1.626 m (5' 4\")   Wt 73 kg (161 lb)   LMP 12/14/2018   BMI 27.64 kg/m²     Physical Exam  Vitals and nursing note reviewed.   Constitutional:       General: She is awake.      Appearance: Normal appearance. She is well-developed. "   HENT:      Head: Normocephalic and atraumatic.      Right Ear: External ear normal.      Left Ear: External ear normal.      Nose: Nose normal.      Mouth/Throat:      Mouth: Mucous membranes are moist.      Pharynx: Oropharynx is clear.   Eyes:      Extraocular Movements: Extraocular movements intact.      Conjunctiva/sclera: Conjunctivae normal.   Cardiovascular:      Rate and Rhythm: Normal rate and regular rhythm.      Heart sounds: Normal heart sounds.   Pulmonary:      Effort: Pulmonary effort is normal.      Breath sounds: Normal breath sounds.   Musculoskeletal:         General: Normal range of motion.      Cervical back: Normal range of motion and neck supple.      Right lower leg: No edema.      Left lower leg: No edema.   Skin:     General: Skin is warm and dry.   Neurological:      Mental Status: She is alert and oriented to person, place, and time.   Psychiatric:         Mood and Affect: Mood normal.         Behavior: Behavior normal.         Thought Content: Thought content normal.     EKG- NSR w/o acute changes    Assessment/Plan:   Assessment    1. Chest pain, unspecified type  - EKG - Clinic Performed  - REFERRAL TO CARDIOLOGY    2. Palpitations  - REFERRAL TO CARDIOLOGY    3. Situational stress      Vital signs stable at today's acute urgent care visit.  Review of any test results completed in clinic.  Recommend patient go to ED for further evaluation high-level care, however she declines as she has began to feel better today.  It is discussed with her that unfortunately urgent care is limited to obtain complete cardiac evaluation she is agreeable to a cardiology referral.    Strict red flags discussed and indications to immediately call 911 or present to the ED. All questions were encouraged and answered to the patient's satisfaction and understanding, and they agree to the plan of care.     This is an acute problem with uncertain prognosis, medication management and instructions as well as  management options were provided.  I personally reviewed prior external notes and test results pertinent to today and independently reviewed and interpreted all diagnostics. Time spent evaluating this patient includes preparing for visit, counseling/education, exam, evaluation, obtaining history, and ordering lab/test/procedures.      Please note that this dictation was created using voice recognition software. I have made a reasonable attempt to correct obvious errors, but I expect that there are errors of grammar and possibly content that I did not discover before finalizing the note.

## 2022-11-10 ENCOUNTER — OFFICE VISIT (OUTPATIENT)
Dept: MEDICAL GROUP | Age: 52
End: 2022-11-10
Payer: COMMERCIAL

## 2022-11-10 VITALS
OXYGEN SATURATION: 98 % | DIASTOLIC BLOOD PRESSURE: 82 MMHG | SYSTOLIC BLOOD PRESSURE: 120 MMHG | HEIGHT: 64 IN | HEART RATE: 74 BPM | RESPIRATION RATE: 16 BRPM | WEIGHT: 166 LBS | BODY MASS INDEX: 28.34 KG/M2 | TEMPERATURE: 97.3 F

## 2022-11-10 DIAGNOSIS — M25.542 ARTHRALGIA OF BOTH HANDS: ICD-10-CM

## 2022-11-10 DIAGNOSIS — R00.2 PALPITATIONS: ICD-10-CM

## 2022-11-10 DIAGNOSIS — Z00.00 BLOOD TESTS FOR ROUTINE GENERAL PHYSICAL EXAMINATION: ICD-10-CM

## 2022-11-10 DIAGNOSIS — F41.1 GAD (GENERALIZED ANXIETY DISORDER): ICD-10-CM

## 2022-11-10 DIAGNOSIS — M25.541 ARTHRALGIA OF BOTH HANDS: ICD-10-CM

## 2022-11-10 DIAGNOSIS — M65.4 DE QUERVAIN'S TENOSYNOVITIS, RIGHT: ICD-10-CM

## 2022-11-10 PROBLEM — E66.9 OBESITY (BMI 30-39.9): Status: RESOLVED | Noted: 2018-06-26 | Resolved: 2022-11-10

## 2022-11-10 PROCEDURE — 99214 OFFICE O/P EST MOD 30 MIN: CPT | Performed by: PHYSICIAN ASSISTANT

## 2022-11-10 ASSESSMENT — FIBROSIS 4 INDEX: FIB4 SCORE: 0.55

## 2022-11-10 NOTE — PROGRESS NOTES
cc: Hand pain, palpitations, anxiety    Subjective:     HPI  Joy Staples is a 52 y.o. female presenting for follow-up visit.    She was seen approximately 6 months ago.  At that time we discussed starting Effexor for her anxiety.  She states that she took this for about 2 months, did help with her anxiety, but made her groggy and had fairly bad nightmares.  She stopped taking this medication about 4 months ago.  In general now she feels like she is managing her anxiety without medications.  She does not want to trial maintenance medications at this time.  She does have propanolol use for abortive therapies, which she finds effective.    She is also been having palpitations, and was recently seen in urgent care.  EKG unremarkable.  She does take propanolol when she has palpitations does help.  They seem to be decreased in frequency.  She was referred to cardiology.  Has not scheduled appointment yet.    She also has concerns of bilateral hand pain, more specifically in the right thumb.  States that it is intermittently swollen, pain is fairly persistent throughout the day, does not get better or worse.  However at the end of the day day she feels both of her hands are swollen.  Pain does radiate up into the wrist.  She does work with her hands a lot, cutting flowers.  Does get improvement with ibuprofen.  Denies numbness or tingling, redness or warmth of the joints    Review of systems:  See above.       Current Outpatient Medications:     estradiol (CLIMARA) 0.1 MG/24HR PATCH WEEKLY, APPLY 1 PATCH TO SKIN TRANSDERMAL ONCE WEEKLY, Disp: , Rfl:     propranolol (INDERAL) 10 MG Tab, Take 1 Tablet by mouth 3 times a day as needed (anxiety). (Patient not taking: Reported on 9/16/2022), Disp: 30 Tablet, Rfl: 1    Allergies, past medical history, past surgical history, family  history, social history reviewed and updated    Objective:     Vitals: /82 (BP Location: Left arm, Patient Position: Sitting, BP Cuff  "Size: Adult)   Pulse 74   Temp 36.3 °C (97.3 °F) (Temporal)   Resp 16   Ht 1.626 m (5' 4\")   Wt 75.3 kg (166 lb)   LMP 12/14/2018   SpO2 98%   BMI 28.49 kg/m²   General: Alert, pleasant, NAD  HEENT: Normocephalic. Neck supple.  No thyromegaly or masses palpated. No cervical or supraclavicular lymphadenopathy. No carotid bruits   Heart: Regular rate and rhythm.  S1 and S2 normal.  No murmurs appreciated.  Respiratory: Normal respiratory effort.  Clear to auscultation bilaterally.  Skin: Warm, dry, no rashes.  Hands: No obvious joint swelling or redness.  Mild tenderness to palpation over the CMC joint, positive Finkelstein's on the right.  Full range of motion of the thumb.  Negative Phalen's and Tinel's  Extremities: No leg edema.  Radial pulses 2+ symmetric  Psych:  Affect/mood is normal, judgement is good, memory is intact, grooming is appropriate.    Assessment/Plan:     Joy was seen today for follow-up.    Diagnoses and all orders for this visit:    De Quervain's tenosynovitis, right  -Positive Finkelstein's on exam.  Advised thumb spica splint, at night.  Ibuprofen as needed.  Also obtain x-ray to evaluate for overlying arthritis.  -     Referral to Orthopedics  -     DX-HAND 3+ RIGHT; Future    Arthralgia of both hands  -She does note swelling in bilateral hands at the end of the day.  We will evaluate for possible RA  -     DANA REFLEXIVE PROFILE; Future  -     Sed Rate; Future  -     RHEUMATOID ARTHRITIS FACTOR; Future  -     DSDNA AB, IGG W/RFLX TO IFA TITER; Future  -     Referral to Orthopedics  -     DX-HAND 3+ RIGHT; Future    Palpitations  -Decreasing in frequency, improved with propranolol.  Continue propranolol as needed, follow-up with cardiology  -     CBC WITH DIFFERENTIAL; Future  -     Comp Metabolic Panel; Future  -     TSH WITH REFLEX TO FT4; Future    ABI (generalized anxiety disorder)  -She feels she is doing well without medications at this point.  If this changes please follow-up " sooner.  -     TSH WITH REFLEX TO FT4; Future    Blood tests for routine general physical examination  -     CBC WITH DIFFERENTIAL; Future  -     Comp Metabolic Panel; Future  -     Lipid Profile; Future  -     TSH WITH REFLEX TO FT4; Future        Return in about 3 months (around 2/10/2023) for Annual PX, Lab Review.

## 2022-11-16 ENCOUNTER — HOSPITAL ENCOUNTER (OUTPATIENT)
Dept: RADIOLOGY | Facility: MEDICAL CENTER | Age: 52
End: 2022-11-16
Attending: PHYSICIAN ASSISTANT
Payer: COMMERCIAL

## 2022-11-16 DIAGNOSIS — M25.542 ARTHRALGIA OF BOTH HANDS: ICD-10-CM

## 2022-11-16 DIAGNOSIS — M25.541 ARTHRALGIA OF BOTH HANDS: ICD-10-CM

## 2022-11-16 DIAGNOSIS — M65.4 DE QUERVAIN'S TENOSYNOVITIS, RIGHT: ICD-10-CM

## 2022-11-16 PROCEDURE — 73130 X-RAY EXAM OF HAND: CPT | Mod: RT

## 2022-11-18 LAB
ALBUMIN SERPL-MCNC: 4.4 G/DL (ref 3.8–4.9)
ALBUMIN/GLOB SERPL: 1.5 {RATIO} (ref 1.2–2.2)
ALP SERPL-CCNC: 74 IU/L (ref 44–121)
ALT SERPL-CCNC: 17 IU/L (ref 0–32)
ANA SER QL: NEGATIVE
AST SERPL-CCNC: 15 IU/L (ref 0–40)
BASOPHILS # BLD AUTO: 0 X10E3/UL (ref 0–0.2)
BASOPHILS NFR BLD AUTO: 1 %
BILIRUB SERPL-MCNC: 0.4 MG/DL (ref 0–1.2)
BUN SERPL-MCNC: 17 MG/DL (ref 6–24)
BUN/CREAT SERPL: 25 (ref 9–23)
CALCIUM SERPL-MCNC: 9.5 MG/DL (ref 8.7–10.2)
CHLORIDE SERPL-SCNC: 102 MMOL/L (ref 96–106)
CO2 SERPL-SCNC: 25 MMOL/L (ref 20–29)
CREAT SERPL-MCNC: 0.67 MG/DL (ref 0.57–1)
DSDNA AB SER-ACNC: <1 IU/ML (ref 0–9)
EGFRCR SERPLBLD CKD-EPI 2021: 106 ML/MIN/1.73
EOSINOPHIL # BLD AUTO: 0.2 X10E3/UL (ref 0–0.4)
EOSINOPHIL NFR BLD AUTO: 3 %
ERYTHROCYTE [DISTWIDTH] IN BLOOD BY AUTOMATED COUNT: 12.1 % (ref 11.7–15.4)
ERYTHROCYTE [SEDIMENTATION RATE] IN BLOOD BY WESTERGREN METHOD: 9 MM/HR (ref 0–40)
GLOBULIN SER CALC-MCNC: 3 G/DL (ref 1.5–4.5)
GLUCOSE SERPL-MCNC: 102 MG/DL (ref 70–99)
HCT VFR BLD AUTO: 41.6 % (ref 34–46.6)
HGB BLD-MCNC: 14.3 G/DL (ref 11.1–15.9)
IMM GRANULOCYTES # BLD AUTO: 0 X10E3/UL (ref 0–0.1)
IMM GRANULOCYTES NFR BLD AUTO: 0 %
IMMATURE CELLS  115398: NORMAL
LYMPHOCYTES # BLD AUTO: 2.2 X10E3/UL (ref 0.7–3.1)
LYMPHOCYTES NFR BLD AUTO: 29 %
MCH RBC QN AUTO: 32 PG (ref 26.6–33)
MCHC RBC AUTO-ENTMCNC: 34.4 G/DL (ref 31.5–35.7)
MCV RBC AUTO: 93 FL (ref 79–97)
MONOCYTES # BLD AUTO: 0.6 X10E3/UL (ref 0.1–0.9)
MONOCYTES NFR BLD AUTO: 8 %
MORPHOLOGY BLD-IMP: NORMAL
NEUTROPHILS # BLD AUTO: 4.5 X10E3/UL (ref 1.4–7)
NEUTROPHILS NFR BLD AUTO: 59 %
NRBC BLD AUTO-RTO: NORMAL %
PLATELET # BLD AUTO: 385 X10E3/UL (ref 150–450)
POTASSIUM SERPL-SCNC: 4.2 MMOL/L (ref 3.5–5.2)
PROT SERPL-MCNC: 7.4 G/DL (ref 6–8.5)
RBC # BLD AUTO: 4.47 X10E6/UL (ref 3.77–5.28)
RHEUMATOID FACT SERPL-ACNC: <10 IU/ML
SODIUM SERPL-SCNC: 139 MMOL/L (ref 134–144)
WBC # BLD AUTO: 7.5 X10E3/UL (ref 3.4–10.8)

## 2022-12-06 ENCOUNTER — OFFICE VISIT (OUTPATIENT)
Dept: OPHTHALMOLOGY | Facility: MEDICAL CENTER | Age: 52
End: 2022-12-06
Payer: COMMERCIAL

## 2022-12-06 DIAGNOSIS — H40.003 GLAUCOMA SUSPECT OF BOTH EYES: ICD-10-CM

## 2022-12-06 DIAGNOSIS — H52.4 ACCOMMODATIVE INSUFFICIENCY: ICD-10-CM

## 2022-12-06 DIAGNOSIS — H53.19 ENTOPTIC PHENOMENA: ICD-10-CM

## 2022-12-06 DIAGNOSIS — Q04.6 COLLOID CYST OF BRAIN (HCC): ICD-10-CM

## 2022-12-06 PROCEDURE — 99203 OFFICE O/P NEW LOW 30 MIN: CPT | Mod: 25 | Performed by: OPHTHALMOLOGY

## 2022-12-06 PROCEDURE — 92250 FUNDUS PHOTOGRAPHY W/I&R: CPT | Performed by: OPHTHALMOLOGY

## 2022-12-06 PROCEDURE — 92015 DETERMINE REFRACTIVE STATE: CPT | Performed by: OPHTHALMOLOGY

## 2022-12-06 ASSESSMENT — TONOMETRY
OS_IOP_MMHG: 12
OD_IOP_MMHG: 12
IOP_METHOD: I-CARE

## 2022-12-06 ASSESSMENT — CONF VISUAL FIELD
OD_SUPERIOR_TEMPORAL_RESTRICTION: 0
OS_INFERIOR_NASAL_RESTRICTION: 0
OS_NORMAL: 1
OD_SUPERIOR_NASAL_RESTRICTION: 0
OS_SUPERIOR_TEMPORAL_RESTRICTION: 0
OD_INFERIOR_TEMPORAL_RESTRICTION: 0
OD_INFERIOR_NASAL_RESTRICTION: 0
OS_SUPERIOR_NASAL_RESTRICTION: 0
OD_NORMAL: 1
OS_INFERIOR_TEMPORAL_RESTRICTION: 0

## 2022-12-06 ASSESSMENT — VISUAL ACUITY
OD_SC: 20/20
OS_SC+: -2
METHOD: SNELLEN - LINEAR
OD_SC: J5
OS_SC: 20/20
OS_SC: J5

## 2022-12-06 ASSESSMENT — REFRACTION
OD_CYLINDER: +0.25
OD_AXIS: 121
OS_SPHERE: +0.50
OS_AXIS: 064
OD_SPHERE: +0.50
OS_CYLINDER: +0.50

## 2022-12-06 ASSESSMENT — CUP TO DISC RATIO
OD_RATIO: 0.5
OS_RATIO: 0.5

## 2022-12-06 ASSESSMENT — ENCOUNTER SYMPTOMS
EYE DISCHARGE: 1
SENSORY CHANGE: 1
EYE PAIN: 1
BLURRED VISION: 1
DIZZINESS: 1
HEADACHES: 1

## 2022-12-06 ASSESSMENT — EXTERNAL EXAM - RIGHT EYE: OD_EXAM: NORMAL

## 2022-12-06 ASSESSMENT — SLIT LAMP EXAM - LIDS
COMMENTS: NORMAL
COMMENTS: NORMAL

## 2022-12-06 ASSESSMENT — REFRACTION_MANIFEST
OS_CYLINDER: +0.25
OD_AXIS: 125
OS_AXIS: 058
OD_SPHERE: +0.00
OD_CYLINDER: +0.50
OS_SPHERE: +0.25

## 2022-12-06 ASSESSMENT — EXTERNAL EXAM - LEFT EYE: OS_EXAM: NORMAL

## 2022-12-06 NOTE — ASSESSMENT & PLAN NOTE
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  Some of her episodes of intermittent distance blurry vision may be related to accommodative insufficiency.  Some of her headaches may also be related to accommodative spasm.  Therefore discussed giving her distance hyperopic correction.  Because she also has decreasing vision and near with presbyopia I discussed progressive or bifocal.  However she wished to hold off and continue with her reading glasses at this time

## 2022-12-06 NOTE — ASSESSMENT & PLAN NOTE
12/6/2022-removal of colloid cyst involving the frontal lobe.  It does not appear to be affecting the visual pathways and OCT nerve fiber thickness did not demonstrate any retrograde axonal degeneration

## 2022-12-06 NOTE — ASSESSMENT & PLAN NOTE
5/6/2022-intraocular pressures normal.  OCT nerve fiber layer thickness normal at 103  OS 0.5 cups

## 2022-12-06 NOTE — ASSESSMENT & PLAN NOTE
12/6/2022-some of her symptoms of seeing squiggly lines on a pale background, some movements when doing certain lights suggest recognition of an entopic phenomenon.  This may be exacerbated by some headaches as well as her undercorrected hyperopia.  I discussed first trial of distance Rx to correct the hyperopia.  I discussed that this is not a pathologic phenomenon but recognition I have a normal visual symptom.  Given the headaches if the plane distance Rx does not improve, I discussed FL 41 lenses

## 2022-12-06 NOTE — PROGRESS NOTES
Peds/Neuro Ophthalmology:   Zackary Weaver M.D.    Date & Time note created:    12/6/2022   12:17 PM     Referring MD / APRN:  Maria Fernanda Hicks P.A.-C., No att. providers found    Patient ID:  Name:             Joy Staples   YOB: 1970  Age:                 52 y.o.  female   MRN:               [id number]    Chief Complaint/Reason for Visit:     Other (New patient for visual disturbance in both eyes)      History of Present Illness:    Joy Staples is a 52 y.o. female   New patient for visual disturbance in both eyes. Pt states vision has decreased more when she does close up work or has to see fine print. Only wear otc readers at this time. Pt has visual disturbance when she stares at something for to long. It almost seems like an oasis phenomenon. Pt states this happens almost daily and it makes her very dizzy. Has a lot of sinus problems and allergies. Feels that headaches are getting worse and them seem to be coming from her eye area. Pt has dry watery, itchy and scratchy eyes. Uses OTC refresh at least twice or once a day. Pt states everyday she gets a blue light show that appear for a few seconds. Pt also has experienced when she goes into a white room it can seem foggy or like white smoke.       Review of Systems:  Review of Systems   Eyes:  Positive for blurred vision, pain and discharge.        Watery eyes OU  Dry eyes OU   Neurological:  Positive for dizziness, sensory change and headaches.   All other systems reviewed and are negative.    Past Medical History:   Past Medical History:   Diagnosis Date    BPPV (benign paroxysmal positional vertigo) 7/29/2016    Colloid cyst of brain (HCC)     s/p resection 2005       Past Surgical History:  Past Surgical History:   Procedure Laterality Date    MS LAP,APPENDECTOMY  5/16/2021    Procedure: APPENDECTOMY, LAPAROSCOPIC;  Surgeon: Hank Magana M.D.;  Location: SURGERY [facility];  Service: General    APPENDECTOMY   05/16/2021    ABDOMINAL HYSTERECTOMY TOTAL      total    CHOLECYSTECTOMY      CRANIOTOMY         Current Outpatient Medications:  Current Outpatient Medications   Medication Sig Dispense Refill    propranolol (INDERAL) 10 MG Tab Take 1 Tablet by mouth 3 times a day as needed (anxiety). (Patient not taking: Reported on 9/16/2022) 30 Tablet 1    estradiol (CLIMARA) 0.1 MG/24HR PATCH WEEKLY APPLY 1 PATCH TO SKIN TRANSDERMAL ONCE WEEKLY       No current facility-administered medications for this visit.       Allergies:  No Known Allergies    Family History:  Family History   Problem Relation Age of Onset    No Known Problems Mother     Heart Disease Father     Diabetes Brother         type 1    Cancer Brother 38        Gleoblastoma    Rheumatologic Disease Sister         RA    Diabetes Son         type 1    Schizophrenia Son        Social History:  Social History     Socioeconomic History    Marital status: Single     Spouse name: Not on file    Number of children: Not on file    Years of education: Not on file    Highest education level: Not on file   Occupational History    Not on file   Tobacco Use    Smoking status: Never    Smokeless tobacco: Never   Vaping Use    Vaping Use: Never used   Substance and Sexual Activity    Alcohol use: Never     Comment: Occasionally    Drug use: Never    Sexual activity: Yes   Other Topics Concern    Not on file   Social History Narrative    [employer]      Social Determinants of Health     Financial Resource Strain: Not on file   Food Insecurity: Not on file   Transportation Needs: Not on file   Physical Activity: Not on file   Stress: Not on file   Social Connections: Not on file   Intimate Partner Violence: Not on file   Housing Stability: Not on file          Physical Exam:  Physical Exam    Oriented x 3  Weight/BMI: There is no height or weight on file to calculate BMI.  There were no vitals taken for this visit.    Base Eye Exam       Visual Acuity (Snellen - Linear)          Right Left    Dist sc 20/20 20/20 -2    Dist ph sc NI NI    Near sc J5 J5              Tonometry (i[facility], 10:27 AM)         Right Left    Pressure 12 12              Pupils         Pupils    Right PERRL    Left PERRL              Visual Fields         Right Left     Full Full              Extraocular Movement         Right Left     Full, Ortho Full, Ortho              Neuro/Psych       Oriented x3: Yes    Mood/Affect: Normal              Dilation       Both eyes: Tropicamide (MYDRIACYL) 1% ophthalmic solution, Phenylephrine (NEOSYNEPHRINE) ophthalmic solution 2.5%                   Additional Tests       Color         Right Left    Ishihara 12/12 12/12              Stereo       Fly: +    Animals: 3/3    Circles: 4/9                  Slit Lamp and Fundus Exam       External Exam         Right Left    External Normal Normal              Slit Lamp Exam         Right Left    Lids/Lashes Normal Normal    Conjunctiva/Sclera White and quiet White and quiet    Cornea Clear Clear    Anterior Chamber Deep and quiet Deep and quiet    Iris Round and reactive Round and reactive    Lens Clear Clear    Vitreous Normal Normal              Fundus Exam         Right Left    Disc Normal Normal    C/D Ratio 0.5 0.5    Macula Normal Normal    Vessels Normal Normal    Periphery Normal Normal                  Refraction       Manifest Refraction         Sphere Cylinder Axis    Right +0.00 +0.50 125    Left +0.25 +0.25 058              Cycloplegic Refraction (Auto)         Sphere Cylinder Axis    Right +0.50 +0.25 121    Left +0.50 +0.50 064              Final Rx         Sphere Cylinder Axis    Right +0.50      Left +0.50 +0.25 060                    Pertinent Lab/Test/Imaging Review:      Assessment and Plan:     Accommodative insufficiency  12/6/2022-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  Some of her episodes of intermittent distance blurry vision may be related to accommodative insufficiency.  Some of her headaches may also  be related to accommodative spasm.  Therefore discussed giving her distance hyperopic correction.  Because she also has decreasing vision and near with presbyopia I discussed progressive or bifocal.  However she wished to hold off and continue with her reading glasses at this time    Entoptic phenomena  12/6/2022-some of her symptoms of seeing squiggly lines on a pale background, some movements when doing certain lights suggest recognition of an entopic phenomenon.  This may be exacerbated by some headaches as well as her undercorrected hyperopia.  I discussed first trial of distance Rx to correct the hyperopia.  I discussed that this is not a pathologic phenomenon but recognition I have a normal visual symptom.  Given the headaches if the plane distance Rx does not improve, I discussed FL 41 lenses    Glaucoma suspect of both eyes  5/6/2022-intraocular pressures normal.  OCT nerve fiber layer thickness normal at 103  OS 0.5 cups    Colloid cyst of brain (HCC)  12/6/2022-removal of colloid cyst involving the frontal lobe.  It does not appear to be affecting the visual pathways and OCT nerve fiber thickness did not demonstrate any retrograde axonal degeneration        Zackary Weaver M.D.

## 2023-02-06 ENCOUNTER — APPOINTMENT (OUTPATIENT)
Dept: RADIOLOGY | Facility: IMAGING CENTER | Age: 53
End: 2023-02-06
Attending: PHYSICIAN ASSISTANT
Payer: COMMERCIAL

## 2023-02-06 ENCOUNTER — OFFICE VISIT (OUTPATIENT)
Dept: URGENT CARE | Facility: CLINIC | Age: 53
End: 2023-02-06
Payer: COMMERCIAL

## 2023-02-06 VITALS
OXYGEN SATURATION: 98 % | HEIGHT: 63 IN | BODY MASS INDEX: 29.59 KG/M2 | HEART RATE: 72 BPM | SYSTOLIC BLOOD PRESSURE: 104 MMHG | WEIGHT: 167 LBS | RESPIRATION RATE: 14 BRPM | TEMPERATURE: 98.5 F | DIASTOLIC BLOOD PRESSURE: 72 MMHG

## 2023-02-06 DIAGNOSIS — S99.911A INJURY OF RIGHT ANKLE, INITIAL ENCOUNTER: ICD-10-CM

## 2023-02-06 DIAGNOSIS — S93.401A SPRAIN OF RIGHT ANKLE, UNSPECIFIED LIGAMENT, INITIAL ENCOUNTER: ICD-10-CM

## 2023-02-06 PROCEDURE — 99213 OFFICE O/P EST LOW 20 MIN: CPT | Performed by: PHYSICIAN ASSISTANT

## 2023-02-06 PROCEDURE — 73610 X-RAY EXAM OF ANKLE: CPT | Mod: TC,RT | Performed by: PHYSICIAN ASSISTANT

## 2023-02-06 ASSESSMENT — ENCOUNTER SYMPTOMS
NUMBNESS: 0
LOSS OF SENSATION: 0
TINGLING: 0
LOSS OF MOTION: 0
MUSCLE WEAKNESS: 0
INABILITY TO BEAR WEIGHT: 0

## 2023-02-06 ASSESSMENT — FIBROSIS 4 INDEX: FIB4 SCORE: 0.48

## 2023-02-06 NOTE — PROGRESS NOTES
Subjective     Joy Staples is a 52 y.o. female who presents with Ankle Injury ((R) x today twisted ankle. )            This is a new problem.  The patient presents to clinic complaining of an injury to her right ankle x today.  The patient states she was stepping off the edge of the bathtub when she rolled her right ankle.  The patient states that she inverted her right ankle as she stepped down.  The patient developed subsequent pain and swelling to the outer aspect of her right ankle.  She reports no associated bruising or redness.  The patient states that she is experiencing increased pain with walking and movement.  She reports no numbness, tingling, or weakness.  The patient is taken OTC ibuprofen for her current symptoms.    Ankle Injury   The incident occurred 1 to 3 hours ago. The incident occurred at home. The injury mechanism was an inversion injury. The pain is present in the right ankle. The pain is mild. The pain has been Constant since onset. Pertinent negatives include no inability to bear weight, loss of motion, loss of sensation, muscle weakness, numbness or tingling. The symptoms are aggravated by movement and weight bearing. She has tried NSAIDs for the symptoms.       PMH:  has a past medical history of BPPV (benign paroxysmal positional vertigo) (7/29/2016) and Colloid cyst of brain (HCC).  MEDS:   Current Outpatient Medications:     estradiol (CLIMARA) 0.1 MG/24HR PATCH WEEKLY, APPLY 1 PATCH TO SKIN TRANSDERMAL ONCE WEEKLY, Disp: , Rfl:     propranolol (INDERAL) 10 MG Tab, Take 1 Tablet by mouth 3 times a day as needed (anxiety). (Patient not taking: Reported on 9/16/2022), Disp: 30 Tablet, Rfl: 1  ALLERGIES: No Known Allergies  SURGHX:   Past Surgical History:   Procedure Laterality Date    AL LAP,APPENDECTOMY  5/16/2021    Procedure: APPENDECTOMY, LAPAROSCOPIC;  Surgeon: Hank Magana M.D.;  Location: SURGERY [facility];  Service: General    APPENDECTOMY  05/16/2021     "ABDOMINAL HYSTERECTOMY TOTAL      total    CHOLECYSTECTOMY      CRANIOTOMY       SOCHX:  reports that she has never smoked. She has never used smokeless tobacco. She reports that she does not drink alcohol and does not use drugs.  FH: Family history was reviewed, no pertinent findings to report    Review of Systems   Neurological:  Negative for tingling and numbness.            Objective     /72   Pulse 72   Temp 36.9 °C (98.5 °F) (Temporal)   Resp 14   Ht 1.6 m (5' 3\")   Wt 75.8 kg (167 lb)   LMP 12/14/2018   SpO2 98%   BMI 29.58 kg/m²      Physical Exam  Constitutional:       General: She is not in acute distress.     Appearance: Normal appearance. She is well-developed.   HENT:      Head: Normocephalic and atraumatic.      Right Ear: External ear normal.      Left Ear: External ear normal.      Nose: Nose normal.   Eyes:      Extraocular Movements: Extraocular movements intact.      Conjunctiva/sclera: Conjunctivae normal.   Cardiovascular:      Rate and Rhythm: Normal rate.   Pulmonary:      Effort: Pulmonary effort is normal.   Musculoskeletal:      Cervical back: Normal range of motion and neck supple.      Comments:   Right Ankle:  Tenderness to the lateral aspect of the right ankle with localized edema.  No additional tenderness to the right ankle.  No tenderness to the right foot.  No tenderness overlying the fifth metatarsal.  No ecchymosis.  No overlying erythema.  No increased warmth.  No open wounds/abrasions.  Decreased ROM -the patient demonstrates limited range of motion of the right ankle secondary to pain and swelling  Neurovascular intact distally  Strength 5/5 -dorsiflexion/plantarflexion of the right ankle/foot against resistance   Gait -not assessed due to use of wheelchair for her acute injury   Skin:     General: Skin is warm and dry.   Neurological:      Mental Status: She is alert and oriented to person, place, and time.             Progress:  Right Ankle XR:   COMPARISON: " None.     FINDINGS:     BONE MINERALIZATION: Normal.  JOINTS: Preserved. No erosions.  FRACTURE: None.  DISLOCATION: None.  SOFT TISSUES: No mass.     IMPRESSION:  No acute osseous abnormality.     Reviewed x-rays also with the patient in clinic.    Provided the patient with an ankle brace for her acute sprain.    The patient is also requesting crutches at this time.  Provided the patient with crutches for her ankle sprain.         Assessment & Plan          1. Injury of right ankle, initial encounter  - DX-ANKLE 3+ VIEWS RIGHT; Future    2. Sprain of right ankle, unspecified ligament, initial encounter    Differential diagnoses, supportive care, and indications for immediate follow-up discussed with patient.   Instructed to return to clinic or nearest emergency department for any change in condition, further concerns, or worsening of symptoms.    OTC NSAIDs for pain/discomfort   RICE  Wear brace for additional support  Weight-bearing as tolerated  --Use crutches as needed  Follow-up with PCP   Return to clinic or go tot he ED if symptoms worsen or fail to improve, or if the patient should develop worsening/increasing pain/tenderness, swelling, bruising, redness or warmth to the affected area, decreased ROM, numbness, tingling or weakness, difficulty walking, fever/chills, and/or any concerning symptoms.     Discussed plan with the patient, and she agrees to the above.     I personally reviewed prior external notes and test results pertinent to today's visit.  I have independently reviewed and interpreted all diagnostics ordered during this urgent care visit.     Please note that this dictation was created using voice recognition software. I have made every reasonable attempt to correct obvious errors, but I expect that there may be errors of grammar and possibly content that I did not discover before finalizing the note.     This note was electronically signed by Mariajose Bowling PA-C

## 2023-03-08 ENCOUNTER — HOSPITAL ENCOUNTER (OUTPATIENT)
Dept: LAB | Facility: MEDICAL CENTER | Age: 53
End: 2023-03-08
Attending: PHYSICIAN ASSISTANT
Payer: COMMERCIAL

## 2023-03-08 DIAGNOSIS — M25.542 ARTHRALGIA OF BOTH HANDS: ICD-10-CM

## 2023-03-08 DIAGNOSIS — Z00.00 BLOOD TESTS FOR ROUTINE GENERAL PHYSICAL EXAMINATION: ICD-10-CM

## 2023-03-08 DIAGNOSIS — M25.541 ARTHRALGIA OF BOTH HANDS: ICD-10-CM

## 2023-03-08 LAB
ALBUMIN SERPL BCP-MCNC: 4.1 G/DL (ref 3.2–4.9)
ALBUMIN/GLOB SERPL: 1.3 G/DL
ALP SERPL-CCNC: 68 U/L (ref 30–99)
ALT SERPL-CCNC: 23 U/L (ref 2–50)
ANION GAP SERPL CALC-SCNC: 12 MMOL/L (ref 7–16)
AST SERPL-CCNC: 17 U/L (ref 12–45)
BASOPHILS # BLD AUTO: 0.5 % (ref 0–1.8)
BASOPHILS # BLD: 0.04 K/UL (ref 0–0.12)
BILIRUB SERPL-MCNC: 0.2 MG/DL (ref 0.1–1.5)
BUN SERPL-MCNC: 16 MG/DL (ref 8–22)
CALCIUM ALBUM COR SERPL-MCNC: 8.9 MG/DL (ref 8.5–10.5)
CALCIUM SERPL-MCNC: 9 MG/DL (ref 8.5–10.5)
CHLORIDE SERPL-SCNC: 101 MMOL/L (ref 96–112)
CHOLEST SERPL-MCNC: 209 MG/DL (ref 100–199)
CO2 SERPL-SCNC: 23 MMOL/L (ref 20–33)
CREAT SERPL-MCNC: 0.69 MG/DL (ref 0.5–1.4)
EOSINOPHIL # BLD AUTO: 0.08 K/UL (ref 0–0.51)
EOSINOPHIL NFR BLD: 0.9 % (ref 0–6.9)
ERYTHROCYTE [DISTWIDTH] IN BLOOD BY AUTOMATED COUNT: 42.9 FL (ref 35.9–50)
GFR SERPLBLD CREATININE-BSD FMLA CKD-EPI: 104 ML/MIN/1.73 M 2
GLOBULIN SER CALC-MCNC: 3.1 G/DL (ref 1.9–3.5)
GLUCOSE SERPL-MCNC: 97 MG/DL (ref 65–99)
HCT VFR BLD AUTO: 39.8 % (ref 37–47)
HDLC SERPL-MCNC: 60 MG/DL
HGB BLD-MCNC: 13.2 G/DL (ref 12–16)
IMM GRANULOCYTES # BLD AUTO: 0.04 K/UL (ref 0–0.11)
IMM GRANULOCYTES NFR BLD AUTO: 0.5 % (ref 0–0.9)
LDLC SERPL CALC-MCNC: 120 MG/DL
LYMPHOCYTES # BLD AUTO: 2.61 K/UL (ref 1–4.8)
LYMPHOCYTES NFR BLD: 29.7 % (ref 22–41)
MCH RBC QN AUTO: 31.7 PG (ref 27–33)
MCHC RBC AUTO-ENTMCNC: 33.2 G/DL (ref 33.6–35)
MCV RBC AUTO: 95.7 FL (ref 81.4–97.8)
MONOCYTES # BLD AUTO: 0.59 K/UL (ref 0–0.85)
MONOCYTES NFR BLD AUTO: 6.7 % (ref 0–13.4)
NEUTROPHILS # BLD AUTO: 5.42 K/UL (ref 2–7.15)
NEUTROPHILS NFR BLD: 61.7 % (ref 44–72)
NRBC # BLD AUTO: 0 K/UL
NRBC BLD-RTO: 0 /100 WBC
PLATELET # BLD AUTO: 373 K/UL (ref 164–446)
PMV BLD AUTO: 9.9 FL (ref 9–12.9)
POTASSIUM SERPL-SCNC: 4.3 MMOL/L (ref 3.6–5.5)
PROT SERPL-MCNC: 7.2 G/DL (ref 6–8.2)
RBC # BLD AUTO: 4.16 M/UL (ref 4.2–5.4)
SODIUM SERPL-SCNC: 136 MMOL/L (ref 135–145)
TRIGL SERPL-MCNC: 143 MG/DL (ref 0–149)
TSH SERPL DL<=0.005 MIU/L-ACNC: 1.72 UIU/ML (ref 0.38–5.33)
WBC # BLD AUTO: 8.8 K/UL (ref 4.8–10.8)

## 2023-03-08 PROCEDURE — 84443 ASSAY THYROID STIM HORMONE: CPT

## 2023-03-08 PROCEDURE — 80053 COMPREHEN METABOLIC PANEL: CPT

## 2023-03-08 PROCEDURE — 80061 LIPID PANEL: CPT

## 2023-03-08 PROCEDURE — 85025 COMPLETE CBC W/AUTO DIFF WBC: CPT

## 2023-03-08 PROCEDURE — 36415 COLL VENOUS BLD VENIPUNCTURE: CPT

## 2023-04-17 ENCOUNTER — OFFICE VISIT (OUTPATIENT)
Dept: MEDICAL GROUP | Age: 53
End: 2023-04-17
Payer: COMMERCIAL

## 2023-04-17 VITALS
HEIGHT: 63 IN | TEMPERATURE: 97 F | HEART RATE: 62 BPM | RESPIRATION RATE: 16 BRPM | OXYGEN SATURATION: 98 % | DIASTOLIC BLOOD PRESSURE: 72 MMHG | SYSTOLIC BLOOD PRESSURE: 118 MMHG | BODY MASS INDEX: 30.3 KG/M2 | WEIGHT: 171 LBS

## 2023-04-17 DIAGNOSIS — M79.642 BILATERAL HAND PAIN: ICD-10-CM

## 2023-04-17 DIAGNOSIS — R60.0 BILATERAL LOWER EXTREMITY EDEMA: ICD-10-CM

## 2023-04-17 DIAGNOSIS — E78.5 DYSLIPIDEMIA: ICD-10-CM

## 2023-04-17 DIAGNOSIS — R25.2 BILATERAL LEG CRAMPS: ICD-10-CM

## 2023-04-17 DIAGNOSIS — Z11.59 NEED FOR HEPATITIS C SCREENING TEST: ICD-10-CM

## 2023-04-17 DIAGNOSIS — M79.641 BILATERAL HAND PAIN: ICD-10-CM

## 2023-04-17 PROCEDURE — 99214 OFFICE O/P EST MOD 30 MIN: CPT | Performed by: PHYSICIAN ASSISTANT

## 2023-04-17 ASSESSMENT — FIBROSIS 4 INDEX: FIB4 SCORE: 0.49

## 2023-04-17 ASSESSMENT — PATIENT HEALTH QUESTIONNAIRE - PHQ9: CLINICAL INTERPRETATION OF PHQ2 SCORE: 0

## 2023-04-17 NOTE — PROGRESS NOTES
cc: Concerns of bilateral leg pain and cramping    Subjective:     HPI  Joy Staples is a 53 y.o. female presenting with concerns of bilateral leg pain and cramping.  She states been ongoing for the past 3 to 4 months.  She does stand at work all day long.  Does note edema towards the end of the day.  Resolves with elevation.  She also notes cramping in her feet at night.  Recent metabolic panel within normal limits.  She does not hydrate adequately.  Does not have cramping in the legs during the day or with walking.  Denies chest pain, palpitations, shortness of breath, pallor.    She also has pain in bilateral hands, she does work with her hands at work.  Pain is mostly along the MCP joint of bilateral index fingers.  Does not take anything topically.  Denies redness, warmth.        Review of systems:  See above.    Latest Reference Range & Units 03/08/23 09:18   WBC 4.8 - 10.8 K/uL 8.8   RBC 4.20 - 5.40 M/uL 4.16 (L)   Hemoglobin 12.0 - 16.0 g/dL 13.2   Hematocrit 37.0 - 47.0 % 39.8   MCV 81.4 - 97.8 fL 95.7   MCH 27.0 - 33.0 pg 31.7   MCHC 33.6 - 35.0 g/dL 33.2 (L)   RDW 35.9 - 50.0 fL 42.9   Platelet Count 164 - 446 K/uL 373   MPV 9.0 - 12.9 fL 9.9   Neutrophils-Polys 44.00 - 72.00 % 61.70   Neutrophils (Absolute) 2.00 - 7.15 K/uL 5.42   Lymphocytes 22.00 - 41.00 % 29.70   Lymphs (Absolute) 1.00 - 4.80 K/uL 2.61   Monocytes 0.00 - 13.40 % 6.70   Monos (Absolute) 0.00 - 0.85 K/uL 0.59   Eosinophils 0.00 - 6.90 % 0.90   Eos (Absolute) 0.00 - 0.51 K/uL 0.08   Basophils 0.00 - 1.80 % 0.50   Baso (Absolute) 0.00 - 0.12 K/uL 0.04   Immature Granulocytes 0.00 - 0.90 % 0.50   Immature Granulocytes (abs) 0.00 - 0.11 K/uL 0.04   Nucleated RBC /100 WBC 0.00   NRBC (Absolute) K/uL 0.00   Sodium 135 - 145 mmol/L 136   Potassium 3.6 - 5.5 mmol/L 4.3   Chloride 96 - 112 mmol/L 101   Co2 20 - 33 mmol/L 23   Anion Gap 7.0 - 16.0  12.0   Glucose 65 - 99 mg/dL 97   Bun 8 - 22 mg/dL 16   Creatinine 0.50 - 1.40 mg/dL 0.69  "  GFR (CKD-EPI) >60 mL/min/1.73 m 2 104   Calcium 8.5 - 10.5 mg/dL 9.0   Correct Calcium 8.5 - 10.5 mg/dL 8.9   AST(SGOT) 12 - 45 U/L 17   ALT(SGPT) 2 - 50 U/L 23   Alkaline Phosphatase 30 - 99 U/L 68   Total Bilirubin 0.1 - 1.5 mg/dL 0.2   Albumin 3.2 - 4.9 g/dL 4.1   Total Protein 6.0 - 8.2 g/dL 7.2   Globulin 1.9 - 3.5 g/dL 3.1   A-G Ratio g/dL 1.3   Cholesterol,Tot 100 - 199 mg/dL 209 (H)   Triglycerides 0 - 149 mg/dL 143   HDL >=40 mg/dL 60   LDL <100 mg/dL 120 (H)   TSH 0.380 - 5.330 uIU/mL 1.720      Latest Reference Range & Units 11/16/22 05:12   Rheumatoid Factor -Neph- <14.0 IU/mL <10.0   Anti-Dna -Ds 0 - 9 IU/mL <1   Antinuclear Antibody Direct Negative  Negative         Current Outpatient Medications:     diclofenac sodium (VOLTAREN) 1 % Gel, Apply 2 g topically 4 times a day as needed (pain)., Disp: 50 g, Rfl: 1    propranolol (INDERAL) 10 MG Tab, Take 1 Tablet by mouth 3 times a day as needed (anxiety). (Patient not taking: Reported on 9/16/2022), Disp: 30 Tablet, Rfl: 1    estradiol (CLIMARA) 0.1 MG/24HR PATCH WEEKLY, APPLY 1 PATCH TO SKIN TRANSDERMAL ONCE WEEKLY, Disp: , Rfl:     Allergies, past medical history, past surgical history, family history, social history reviewed and updated    Objective:     Vitals: /72 (BP Location: Left arm, Patient Position: Sitting, BP Cuff Size: Adult)   Pulse 62   Temp 36.1 °C (97 °F) (Temporal)   Resp 16   Ht 1.6 m (5' 3\")   Wt 77.6 kg (171 lb)   LMP 12/14/2018   SpO2 98%   BMI 30.29 kg/m²   General: Alert, pleasant, NAD  HEENT: Normocephalic. Neck supple.  No thyromegaly or masses palpated. No cervical or supraclavicular lymphadenopathy. No carotid bruits   Heart: Regular rate and rhythm.  S1 and S2 normal.  No murmurs appreciated.  Respiratory: Normal respiratory effort.  Clear to auscultation bilaterally.  Skin: Warm, dry, no rashes.  Hands: Minimally tender on the palmar side of bilateral MCP joints of the index fingers.  Extremities: Minimal " nonpitting edema of the feet bilaterally pedal pulses intact bilaterally, slightly decreased.  Scattered varicose veins bilaterally.  No palpable cords, negative Homans' sign.  No redness or warmth.   Psych:  Affect/mood is normal, judgement is good, memory is intact, grooming is appropriate.    Assessment/Plan:     Joy was seen today for foot problem and edema.    Diagnoses and all orders for this visit:    Bilateral lower extremity edema  -She does have scattered varicose veins bilaterally, slight edema, does work on her feet all day.  Possible venous insufficiency.  Did recommend compression stockings.  Will obtain venous ultrasound.  Further treatment as needed pending results  -     MAGNESIUM; Future  -     US-EXTREMITY VENOUS LOWER BILAT; Future  -     US-EXTREMITY ARTERY LOWER BILAT W/CHAD (COMBO); Future    Bilateral leg cramps  -Recent lab panel unremarkable.  No signs of anemia or electrolyte imbalances.  We will check magnesium levels.  She does note moderate cramping at night.  Did advise increase hydration.  Will obtain arterial ultrasound to rule out arterial insufficiencies, as she did have slightly decreased pedal pulses bilaterally.  -     MAGNESIUM; Future  -     US-EXTREMITY VENOUS LOWER BILAT; Future  -     US-EXTREMITY ARTERY LOWER BILAT W/CHAD (COMBO); Future    Bilateral hand pain  -Recommended trial of Voltaren gel, possibly beginnings of arthritis.  If symptoms persist follow-up we will place referral to orthopedics to consider injections.  Did have right hand x-ray, did not show any significant joint arthritis  -     diclofenac sodium (VOLTAREN) 1 % Gel; Apply 2 g topically 4 times a day as needed (pain).    Dyslipidemia  Mildly elevated.  Working on lifestyle modifications    Need for hepatitis C screening test  -     HEP C VIRUS ANTIBODY; Future        Return in about 2 months (around 6/17/2023) for Annual PX.

## 2023-04-21 ENCOUNTER — OFFICE VISIT (OUTPATIENT)
Dept: MEDICAL GROUP | Facility: MEDICAL CENTER | Age: 53
End: 2023-04-21
Payer: COMMERCIAL

## 2023-04-21 VITALS
SYSTOLIC BLOOD PRESSURE: 102 MMHG | HEIGHT: 63 IN | OXYGEN SATURATION: 97 % | HEART RATE: 66 BPM | TEMPERATURE: 97.6 F | RESPIRATION RATE: 16 BRPM | DIASTOLIC BLOOD PRESSURE: 60 MMHG | WEIGHT: 167.55 LBS | BODY MASS INDEX: 29.69 KG/M2

## 2023-04-21 DIAGNOSIS — Z12.31 ENCOUNTER FOR SCREENING MAMMOGRAM FOR BREAST CANCER: ICD-10-CM

## 2023-04-21 DIAGNOSIS — Z12.11 SCREENING FOR COLORECTAL CANCER: ICD-10-CM

## 2023-04-21 DIAGNOSIS — Q04.6 COLLOID CYST OF BRAIN (HCC): ICD-10-CM

## 2023-04-21 DIAGNOSIS — R25.2 CRAMPING OF FEET: ICD-10-CM

## 2023-04-21 DIAGNOSIS — E55.9 VITAMIN D INSUFFICIENCY: ICD-10-CM

## 2023-04-21 DIAGNOSIS — R68.2 DRY MOUTH AND EYES: ICD-10-CM

## 2023-04-21 DIAGNOSIS — E78.2 MIXED HYPERLIPIDEMIA: ICD-10-CM

## 2023-04-21 DIAGNOSIS — H04.123 DRY MOUTH AND EYES: ICD-10-CM

## 2023-04-21 DIAGNOSIS — Z13.1 SCREENING FOR DIABETES MELLITUS: ICD-10-CM

## 2023-04-21 DIAGNOSIS — Z12.12 SCREENING FOR COLORECTAL CANCER: ICD-10-CM

## 2023-04-21 PROBLEM — K59.00 CONSTIPATION: Status: RESOLVED | Noted: 2017-12-12 | Resolved: 2023-04-21

## 2023-04-21 PROCEDURE — 99214 OFFICE O/P EST MOD 30 MIN: CPT | Performed by: FAMILY MEDICINE

## 2023-04-21 ASSESSMENT — ENCOUNTER SYMPTOMS
VOMITING: 0
NAUSEA: 0
COUGH: 0
PALPITATIONS: 0
WHEEZING: 0
ABDOMINAL PAIN: 0
SHORTNESS OF BREATH: 0
BLOOD IN STOOL: 0
CONSTIPATION: 0
DIARRHEA: 0
FEVER: 0
CHILLS: 0

## 2023-04-21 ASSESSMENT — FIBROSIS 4 INDEX: FIB4 SCORE: 0.49

## 2023-04-21 NOTE — ASSESSMENT & PLAN NOTE
Chronic ongoing problem, unstable, discussed could be due to dehydration.  Recommended to use humidifier at home.  We will check connective tissue disease panel to rule out Sjogren's and other autoimmune conditions.

## 2023-04-21 NOTE — ASSESSMENT & PLAN NOTE
New problem, unstable, discussed with be due to dehydration and electrolyte problems.  Check magnesium level.  Check iron level, B12, vitamin D, magnesium, CMP to check for electrolytes.  Recommended to start magnesium supplementation 1-2 times daily.

## 2023-04-21 NOTE — ASSESSMENT & PLAN NOTE
Chronic problem, unstable, counseled in detail regarding importance of dietary modification and exercise for weight loss as well as improving these numbers.  Recheck labs ordered to do before next visit

## 2023-04-21 NOTE — PROGRESS NOTES
FAMILY MEDICINE VISIT                                                               Chief complaint::Diagnoses of Cramping of feet, Mixed hyperlipidemia, Vitamin D insufficiency, Dry mouth and eyes, Screening for colorectal cancer, Encounter for screening mammogram for breast cancer, and Screening for diabetes mellitus were pertinent to this visit.    History of present illness: Joy Staples is a 53 y.o. female, a very pleasant patient who presented  to establish care,  Lab follow-up, cramping of feet, dry mouth, dry eyes    Problem   Cramping of Feet    She has been having cramping in her feet.  No other associated symptoms.  No numbness and tingling in her feet.  Has not tried any medication for this.     Mixed Hyperlipidemia    Last cholesterol panel came back elevated including total cholesterol and LDL came back elevated.  She reports that she works a lot and does not get time for work out.    Lab Results   Component Value Date/Time    CHOLSTRLTOT 209 (H) 03/08/2023 0918    TRIGLYCERIDE 143 03/08/2023 0918    HDL 60 03/08/2023 0918     (H) 03/08/2023 0918        Vitamin D Insufficiency    History of previous vitamin D insufficiency, we will recheck her vitamin levels     Dry Mouth and Eyes    She has been experiencing a lot of symptoms including dry mouth, dry eyes, facial swelling and hand swelling.  She had blood work done including an DANA titer inflammatory marker and rheumatoid arthritis which came back negative.  She was thinking it could be because of kidney problems.  Recent kidney function test came back normal.       Review of systems:     Review of Systems   Constitutional:  Negative for chills, fever and malaise/fatigue.   HENT:  Negative for congestion, ear discharge and ear pain.         Dry mouth   Eyes:         Dryness in eyes   Respiratory:  Negative for cough, shortness of breath and wheezing.    Cardiovascular:  Negative for chest pain, palpitations and leg swelling.  "  Gastrointestinal:  Negative for abdominal pain, blood in stool, constipation, diarrhea, nausea and vomiting.   Musculoskeletal:         Cramping of feet      Medications and Allergies:     Current Outpatient Medications   Medication Sig Dispense Refill    estradiol (CLIMARA) 0.1 MG/24HR PATCH WEEKLY APPLY 1 PATCH TO SKIN TRANSDERMAL ONCE WEEKLY       No current facility-administered medications for this visit.          Vitals:    /60   Pulse 66   Temp 36.4 °C (97.6 °F)   Resp 16   Ht 1.6 m (5' 3\")   Wt 76 kg (167 lb 8.8 oz)   SpO2 97%  Body mass index is 29.68 kg/m².    Physical Exam:     Physical Exam  Constitutional:       General: She is not in acute distress.     Appearance: Normal appearance. She is well-developed and well-groomed.   HENT:      Head: Normocephalic and atraumatic.      Right Ear: External ear normal.      Left Ear: External ear normal.   Eyes:      General:         Right eye: No discharge.         Left eye: No discharge.      Conjunctiva/sclera: Conjunctivae normal.   Cardiovascular:      Rate and Rhythm: Normal rate and regular rhythm.      Heart sounds: Normal heart sounds. No murmur heard.    No friction rub. No gallop.   Pulmonary:      Effort: Pulmonary effort is normal. No respiratory distress.      Breath sounds: Normal breath sounds. No wheezing or rales.   Musculoskeletal:         General: No deformity.      Cervical back: Neck supple.   Skin:     Findings: No rash.   Neurological:      Mental Status: She is alert.      Gait: Gait is intact.   Psychiatric:         Mood and Affect: Mood and affect normal.         Behavior: Behavior normal.        Labs:  I reviewed with patient recent labs resulted on 3/8/2023    Assessment/Plan:    HCC Gap Form    Diagnosis to address: Q04.6 - Colloid cyst of brain (HCC)  Assessment and plan: Chronic, stable.  S/p resection in 2005.  Recent MRI brain normal continue with current defined treatment plan: Continue to monitor for any worsening " of symptoms.. Follow-up at least annually.  Last edited 04/21/23 15:44 PDT by Checo Scott M.D.          Problem List Items Addressed This Visit       Vitamin D insufficiency     Chronic problem, recheck vitamin D level to assess control.         Relevant Orders    VITAMIN D,25 HYDROXY (DEFICIENCY)    Mixed hyperlipidemia     Chronic problem, unstable, counseled in detail regarding importance of dietary modification and exercise for weight loss as well as improving these numbers.  Recheck labs ordered to do before next visit         Relevant Orders    Lipid Profile    Dry mouth and eyes     Chronic ongoing problem, unstable, discussed could be due to dehydration.  Recommended to use humidifier at home.  We will check connective tissue disease panel to rule out Sjogren's and other autoimmune conditions.         Relevant Orders    CONNECTIVE TISSUE DISEASES PROFILE    Cramping of feet     New problem, unstable, discussed with be due to dehydration and electrolyte problems.  Check magnesium level.  Check iron level, B12, vitamin D, magnesium, CMP to check for electrolytes.  Recommended to start magnesium supplementation 1-2 times daily.         Relevant Orders    CBC WITH DIFFERENTIAL    Comp Metabolic Panel    VITAMIN B12    IRON/TOTAL IRON BIND    FERRITIN    MAGNESIUM     Other Visit Diagnoses       Screening for colorectal cancer        Relevant Orders    Referral to GI for Colonoscopy    Encounter for screening mammogram for breast cancer        Relevant Orders    MA-SCREENING MAMMO BILAT W/TOMOSYNTHESIS W/CAD    Screening for diabetes mellitus        Relevant Orders    HEMOGLOBIN A1C             Please note that this dictation was created using voice recognition software. I have made every reasonable attempt to correct obvious errors, but I expect that there are errors of grammar and possibly content that I did not discover before finalizing the note.    Follow up in 3 months for lab follow-up.

## 2023-04-27 ENCOUNTER — HOSPITAL ENCOUNTER (OUTPATIENT)
Dept: LAB | Facility: MEDICAL CENTER | Age: 53
End: 2023-04-27
Attending: FAMILY MEDICINE
Payer: COMMERCIAL

## 2023-04-27 DIAGNOSIS — Z13.1 SCREENING FOR DIABETES MELLITUS: ICD-10-CM

## 2023-04-27 DIAGNOSIS — E55.9 VITAMIN D INSUFFICIENCY: ICD-10-CM

## 2023-04-27 DIAGNOSIS — R68.2 DRY MOUTH AND EYES: ICD-10-CM

## 2023-04-27 DIAGNOSIS — E78.2 MIXED HYPERLIPIDEMIA: ICD-10-CM

## 2023-04-27 DIAGNOSIS — H04.123 DRY MOUTH AND EYES: ICD-10-CM

## 2023-04-27 DIAGNOSIS — R25.2 CRAMPING OF FEET: ICD-10-CM

## 2023-04-27 LAB
25(OH)D3 SERPL-MCNC: 33 NG/ML (ref 30–100)
ALBUMIN SERPL BCP-MCNC: 4 G/DL (ref 3.2–4.9)
ALBUMIN/GLOB SERPL: 1.2 G/DL
ALP SERPL-CCNC: 64 U/L (ref 30–99)
ALT SERPL-CCNC: 16 U/L (ref 2–50)
ANION GAP SERPL CALC-SCNC: 14 MMOL/L (ref 7–16)
AST SERPL-CCNC: 18 U/L (ref 12–45)
BASOPHILS # BLD AUTO: 0.5 % (ref 0–1.8)
BASOPHILS # BLD: 0.04 K/UL (ref 0–0.12)
BILIRUB SERPL-MCNC: 0.4 MG/DL (ref 0.1–1.5)
BUN SERPL-MCNC: 15 MG/DL (ref 8–22)
CALCIUM ALBUM COR SERPL-MCNC: 9 MG/DL (ref 8.5–10.5)
CALCIUM SERPL-MCNC: 9 MG/DL (ref 8.5–10.5)
CHLORIDE SERPL-SCNC: 101 MMOL/L (ref 96–112)
CHOLEST SERPL-MCNC: 222 MG/DL (ref 100–199)
CO2 SERPL-SCNC: 21 MMOL/L (ref 20–33)
CREAT SERPL-MCNC: 0.67 MG/DL (ref 0.5–1.4)
EOSINOPHIL # BLD AUTO: 0.03 K/UL (ref 0–0.51)
EOSINOPHIL NFR BLD: 0.4 % (ref 0–6.9)
ERYTHROCYTE [DISTWIDTH] IN BLOOD BY AUTOMATED COUNT: 43.1 FL (ref 35.9–50)
EST. AVERAGE GLUCOSE BLD GHB EST-MCNC: 126 MG/DL
FERRITIN SERPL-MCNC: 97.3 NG/ML (ref 10–291)
GFR SERPLBLD CREATININE-BSD FMLA CKD-EPI: 105 ML/MIN/1.73 M 2
GLOBULIN SER CALC-MCNC: 3.3 G/DL (ref 1.9–3.5)
GLUCOSE SERPL-MCNC: 94 MG/DL (ref 65–99)
HBA1C MFR BLD: 6 % (ref 4–5.6)
HCT VFR BLD AUTO: 40.6 % (ref 37–47)
HDLC SERPL-MCNC: 69 MG/DL
HGB BLD-MCNC: 13.6 G/DL (ref 12–16)
IMM GRANULOCYTES # BLD AUTO: 0.04 K/UL (ref 0–0.11)
IMM GRANULOCYTES NFR BLD AUTO: 0.5 % (ref 0–0.9)
IRON SATN MFR SERPL: 27 % (ref 15–55)
IRON SERPL-MCNC: 91 UG/DL (ref 40–170)
LDLC SERPL CALC-MCNC: 131 MG/DL
LYMPHOCYTES # BLD AUTO: 2.33 K/UL (ref 1–4.8)
LYMPHOCYTES NFR BLD: 27.2 % (ref 22–41)
MAGNESIUM SERPL-MCNC: 2 MG/DL (ref 1.5–2.5)
MCH RBC QN AUTO: 31.8 PG (ref 27–33)
MCHC RBC AUTO-ENTMCNC: 33.5 G/DL (ref 33.6–35)
MCV RBC AUTO: 94.9 FL (ref 81.4–97.8)
MONOCYTES # BLD AUTO: 0.49 K/UL (ref 0–0.85)
MONOCYTES NFR BLD AUTO: 5.7 % (ref 0–13.4)
NEUTROPHILS # BLD AUTO: 5.63 K/UL (ref 2–7.15)
NEUTROPHILS NFR BLD: 65.7 % (ref 44–72)
NRBC # BLD AUTO: 0 K/UL
NRBC BLD-RTO: 0 /100 WBC
PLATELET # BLD AUTO: 375 K/UL (ref 164–446)
PMV BLD AUTO: 10 FL (ref 9–12.9)
POTASSIUM SERPL-SCNC: 3.9 MMOL/L (ref 3.6–5.5)
PROT SERPL-MCNC: 7.3 G/DL (ref 6–8.2)
RBC # BLD AUTO: 4.28 M/UL (ref 4.2–5.4)
SODIUM SERPL-SCNC: 136 MMOL/L (ref 135–145)
TIBC SERPL-MCNC: 343 UG/DL (ref 250–450)
TRIGL SERPL-MCNC: 109 MG/DL (ref 0–149)
UIBC SERPL-MCNC: 252 UG/DL (ref 110–370)
VIT B12 SERPL-MCNC: 273 PG/ML (ref 211–911)
WBC # BLD AUTO: 8.6 K/UL (ref 4.8–10.8)

## 2023-04-27 PROCEDURE — 83516 IMMUNOASSAY NONANTIBODY: CPT

## 2023-04-27 PROCEDURE — 83550 IRON BINDING TEST: CPT

## 2023-04-27 PROCEDURE — 82728 ASSAY OF FERRITIN: CPT

## 2023-04-27 PROCEDURE — 83735 ASSAY OF MAGNESIUM: CPT

## 2023-04-27 PROCEDURE — 86235 NUCLEAR ANTIGEN ANTIBODY: CPT

## 2023-04-27 PROCEDURE — 80061 LIPID PANEL: CPT

## 2023-04-27 PROCEDURE — 80053 COMPREHEN METABOLIC PANEL: CPT

## 2023-04-27 PROCEDURE — 83036 HEMOGLOBIN GLYCOSYLATED A1C: CPT

## 2023-04-27 PROCEDURE — 82607 VITAMIN B-12: CPT

## 2023-04-27 PROCEDURE — 83540 ASSAY OF IRON: CPT

## 2023-04-27 PROCEDURE — 85025 COMPLETE CBC W/AUTO DIFF WBC: CPT

## 2023-04-27 PROCEDURE — 82306 VITAMIN D 25 HYDROXY: CPT

## 2023-04-27 PROCEDURE — 36415 COLL VENOUS BLD VENIPUNCTURE: CPT

## 2023-04-28 ENCOUNTER — HOSPITAL ENCOUNTER (OUTPATIENT)
Dept: RADIOLOGY | Facility: MEDICAL CENTER | Age: 53
End: 2023-04-28
Payer: COMMERCIAL

## 2023-04-30 LAB
CENTROMERE IGG TITR SER IF: 0 AU/ML (ref 0–40)
ENA JO1 AB TITR SER: 0 AU/ML (ref 0–40)
ENA SCL70 IGG SER QL: 121 AU/ML (ref 0–40)
ENA SM IGG SER-ACNC: 0 AU/ML (ref 0–40)
ENA SS-B IGG SER IA-ACNC: 0 AU/ML (ref 0–40)
RIBOSOMAL P AB SER-ACNC: 5 AU/ML (ref 0–40)
SSA52 R0ENA AB IGG Q0420: 1 AU/ML (ref 0–40)
SSA60 R0ENA AB IGG Q0419: 0 AU/ML (ref 0–40)
U1 SNRNP IGG SER QL: 3 UNITS (ref 0–19)

## 2023-05-01 DIAGNOSIS — R76.8 SCL-70 ANTIBODY POSITIVE: ICD-10-CM

## 2023-05-01 DIAGNOSIS — R68.2 DRY MOUTH AND EYES: ICD-10-CM

## 2023-05-01 DIAGNOSIS — H04.123 DRY MOUTH AND EYES: ICD-10-CM

## 2023-05-03 ENCOUNTER — HOSPITAL ENCOUNTER (OUTPATIENT)
Dept: RADIOLOGY | Facility: MEDICAL CENTER | Age: 53
End: 2023-05-03
Attending: FAMILY MEDICINE
Payer: COMMERCIAL

## 2023-05-03 DIAGNOSIS — Z12.31 ENCOUNTER FOR SCREENING MAMMOGRAM FOR BREAST CANCER: ICD-10-CM

## 2023-05-03 PROCEDURE — 77063 BREAST TOMOSYNTHESIS BI: CPT

## 2023-05-11 DIAGNOSIS — H04.123 DRY MOUTH AND EYES: ICD-10-CM

## 2023-05-11 DIAGNOSIS — R76.8 SCL-70 ANTIBODY POSITIVE: ICD-10-CM

## 2023-05-11 DIAGNOSIS — R68.2 DRY MOUTH AND EYES: ICD-10-CM

## 2023-05-11 PROCEDURE — 1126F AMNT PAIN NOTED NONE PRSNT: CPT | Performed by: FAMILY MEDICINE

## 2023-05-23 ENCOUNTER — HOSPITAL ENCOUNTER (OUTPATIENT)
Dept: LAB | Facility: MEDICAL CENTER | Age: 53
End: 2023-05-23
Attending: FAMILY MEDICINE
Payer: COMMERCIAL

## 2023-05-23 DIAGNOSIS — H04.123 DRY MOUTH AND EYES: ICD-10-CM

## 2023-05-23 DIAGNOSIS — R76.8 SCL-70 ANTIBODY POSITIVE: ICD-10-CM

## 2023-05-23 DIAGNOSIS — R68.2 DRY MOUTH AND EYES: ICD-10-CM

## 2023-05-23 LAB
C3 SERPL-MCNC: 139.5 MG/DL (ref 87–200)
C4 SERPL-MCNC: 43.1 MG/DL (ref 19–52)
CRP SERPL HS-MCNC: 0.56 MG/DL (ref 0–0.75)
ERYTHROCYTE [SEDIMENTATION RATE] IN BLOOD BY WESTERGREN METHOD: 14 MM/HOUR (ref 0–25)

## 2023-05-23 PROCEDURE — 85652 RBC SED RATE AUTOMATED: CPT

## 2023-05-23 PROCEDURE — 83516 IMMUNOASSAY NONANTIBODY: CPT

## 2023-05-23 PROCEDURE — 86162 COMPLEMENT TOTAL (CH50): CPT

## 2023-05-23 PROCEDURE — 36415 COLL VENOUS BLD VENIPUNCTURE: CPT

## 2023-05-23 PROCEDURE — 86140 C-REACTIVE PROTEIN: CPT

## 2023-05-23 PROCEDURE — 86038 ANTINUCLEAR ANTIBODIES: CPT

## 2023-05-23 PROCEDURE — 86225 DNA ANTIBODY NATIVE: CPT

## 2023-05-23 PROCEDURE — 86160 COMPLEMENT ANTIGEN: CPT | Mod: 91

## 2023-05-25 LAB
CH50 SERPL-ACNC: 60.9 U/ML (ref 38.7–89.9)
CHROMATIN IGG SERPL-ACNC: 3 UNITS (ref 0–19)
DSDNA AB TITR SER CLIF: NORMAL {TITER}
NUCLEAR IGG SER QL IA: NORMAL

## 2023-05-26 LAB — HISTONE IGG SER IA-ACNC: 0.1 UNITS (ref 0–0.9)

## 2023-06-05 ENCOUNTER — OFFICE VISIT (OUTPATIENT)
Dept: MEDICAL GROUP | Facility: MEDICAL CENTER | Age: 53
End: 2023-06-05
Payer: COMMERCIAL

## 2023-06-05 VITALS
OXYGEN SATURATION: 96 % | HEIGHT: 63 IN | RESPIRATION RATE: 16 BRPM | WEIGHT: 165.34 LBS | DIASTOLIC BLOOD PRESSURE: 60 MMHG | SYSTOLIC BLOOD PRESSURE: 110 MMHG | TEMPERATURE: 97 F | BODY MASS INDEX: 29.3 KG/M2 | HEART RATE: 70 BPM

## 2023-06-05 DIAGNOSIS — R68.2 DRY MOUTH AND EYES: ICD-10-CM

## 2023-06-05 DIAGNOSIS — R73.03 PREDIABETES: ICD-10-CM

## 2023-06-05 DIAGNOSIS — E53.8 VITAMIN B12 DEFICIENCY: ICD-10-CM

## 2023-06-05 DIAGNOSIS — E78.2 MIXED HYPERLIPIDEMIA: ICD-10-CM

## 2023-06-05 DIAGNOSIS — H04.123 DRY MOUTH AND EYES: ICD-10-CM

## 2023-06-05 DIAGNOSIS — R76.8 SCL-70 ANTIBODY POSITIVE: ICD-10-CM

## 2023-06-05 DIAGNOSIS — E55.9 VITAMIN D INSUFFICIENCY: ICD-10-CM

## 2023-06-05 PROCEDURE — 3074F SYST BP LT 130 MM HG: CPT | Performed by: FAMILY MEDICINE

## 2023-06-05 PROCEDURE — 99214 OFFICE O/P EST MOD 30 MIN: CPT | Performed by: FAMILY MEDICINE

## 2023-06-05 PROCEDURE — 3078F DIAST BP <80 MM HG: CPT | Performed by: FAMILY MEDICINE

## 2023-06-05 ASSESSMENT — ENCOUNTER SYMPTOMS
CHILLS: 0
FEVER: 0

## 2023-06-05 ASSESSMENT — FIBROSIS 4 INDEX: FIB4 SCORE: .624

## 2023-06-05 NOTE — ASSESSMENT & PLAN NOTE
New problem, unstable, counseled regarding dietary modification and exercise.  Recheck labs in 6 months

## 2023-06-05 NOTE — ASSESSMENT & PLAN NOTE
Chronic problem, vitamin D is at lower end, recommended to take vitamin D 3000 international units daily

## 2023-06-05 NOTE — PATIENT INSTRUCTIONS
[facility]  [address] [place] [address]  Phone: [phone]

## 2023-06-05 NOTE — PROGRESS NOTES
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    History of present illness: Joy Staples is a 53 y.o. female who presented for lab follow up    Problem   Prediabetes    Last A1c came back at 6.0 which increased from previous numbers.  She reports that she likes to eat sugar      Lab Results   Component Value Date/Time    HBA1C 6.0 (H) 04/27/2023 10:43 AM         Vitamin B12 Deficiency    Recent vitamin B12 level came back at 273.  She  started taking B12 supplementation       Mixed Hyperlipidemia      Recent lipid panel showed elevated total cholesterol and LDL level.    Lab Results   Component Value Date/Time    CHOLSTRLTOT 222 (H) 04/27/2023 1043    TRIGLYCERIDE 109 04/27/2023 1043    HDL 69 04/27/2023 1043     (H) 04/27/2023 1043        Vitamin D Insufficiency    Recent vitamin D level came back at 33.  Patient is taking vitamin D Gummies    Component      Latest Ref Rng 4/27/2023   25-Hydroxy Vitamin D 25      30 - 100 ng/mL 33             Dry Mouth and Eyes    She has been experiencing a lot of symptoms including dry mouth, dry eyes, facial swelling and hand swelling.  Recent Scl 70 antibody came back positive.  Other test came back normal              Review of systems:     Review of Systems   Constitutional:  Negative for chills and fever.   HENT:          Dry mouth and eyes   Musculoskeletal:  Positive for joint pain.        Medications and Allergies:     Current Outpatient Medications   Medication Sig Dispense Refill    estradiol (CLIMARA) 0.1 MG/24HR PATCH WEEKLY APPLY 1 PATCH TO SKIN TRANSDERMAL ONCE WEEKLY       No current facility-administered medications for this visit.          Vitals:    /60   Pulse 70   Temp 36.1 °C (97 °F)   Resp 16   Ht 1.6 m (5'  "3\")   Wt 75 kg (165 lb 5.5 oz)   SpO2 96%  Body mass index is 29.29 kg/m².    Physical Exam:     Physical Exam  Constitutional:       Appearance: Normal appearance. She is well-developed and well-groomed.   HENT:      Head: Normocephalic and atraumatic.      Right Ear: External ear normal.      Left Ear: External ear normal.   Eyes:      General:         Right eye: No discharge.         Left eye: No discharge.      Conjunctiva/sclera: Conjunctivae normal.   Cardiovascular:      Rate and Rhythm: Normal rate.   Pulmonary:      Effort: Pulmonary effort is normal. No respiratory distress.   Musculoskeletal:      Cervical back: Neck supple.   Skin:     Findings: No rash.   Neurological:      Mental Status: She is alert.   Psychiatric:         Mood and Affect: Mood and affect normal.         Behavior: Behavior normal.          Labs:  I reviewed with patient recent labs resulted on 4/27/2023 and 5/23/2023    Assessment/Plan:         Problem List Items Addressed This Visit       Vitamin D insufficiency     Chronic problem, vitamin D is at lower end, recommended to take vitamin D 3000 international units daily           Relevant Orders    VITAMIN D,25 HYDROXY (DEFICIENCY)    Vitamin B12 deficiency     Chronic problem, unstable, recommended to take B12 supplementation 1000 mcg / 100 mg daily.           Relevant Orders    VITAMIN B12    Prediabetes     New problem, unstable, counseled regarding dietary modification and exercise.  Recheck labs in 6 months           Relevant Orders    HEMOGLOBIN A1C    Mixed hyperlipidemia     Chronic problem, unstable, counseled regarding dietary modification and exercise.  Recheck labs in 6 months           Relevant Orders    Comp Metabolic Panel    Lipid Profile    Dry mouth and eyes     Chronic problem, unstable, referral to rheumatology placed for evaluation for this.           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 I have made every reasonable attempt to correct obvious errors, but I expect that there are errors of grammar and possibly content that I did not discover before finalizing the note.    Follow up in 6 months for lab follow-up.

## 2023-06-05 NOTE — ASSESSMENT & PLAN NOTE
Chronic problem, unstable, counseled regarding dietary modification and exercise.  Recheck labs in 6 months

## 2023-06-21 ENCOUNTER — TELEPHONE (OUTPATIENT)
Dept: MEDICAL GROUP | Facility: MEDICAL CENTER | Age: 53
End: 2023-06-21
Payer: COMMERCIAL

## 2023-06-21 DIAGNOSIS — R76.8 SCL-70 ANTIBODY POSITIVE: ICD-10-CM

## 2023-06-21 DIAGNOSIS — H04.123 DRY MOUTH AND EYES: ICD-10-CM

## 2023-06-21 DIAGNOSIS — R68.2 DRY MOUTH AND EYES: ICD-10-CM

## 2023-06-29 ENCOUNTER — TELEPHONE (OUTPATIENT)
Dept: MEDICAL GROUP | Facility: MEDICAL CENTER | Age: 53
End: 2023-06-29

## 2023-06-29 ENCOUNTER — OFFICE VISIT (OUTPATIENT)
Dept: MEDICAL GROUP | Facility: MEDICAL CENTER | Age: 53
End: 2023-06-29
Payer: COMMERCIAL

## 2023-06-29 VITALS
TEMPERATURE: 97.6 F | WEIGHT: 163.14 LBS | HEART RATE: 62 BPM | RESPIRATION RATE: 12 BRPM | OXYGEN SATURATION: 97 % | DIASTOLIC BLOOD PRESSURE: 82 MMHG | SYSTOLIC BLOOD PRESSURE: 122 MMHG | BODY MASS INDEX: 28.91 KG/M2 | HEIGHT: 63 IN

## 2023-06-29 DIAGNOSIS — R76.8 SCL-70 ANTIBODY POSITIVE: ICD-10-CM

## 2023-06-29 DIAGNOSIS — J30.2 SEASONAL ALLERGIES: ICD-10-CM

## 2023-06-29 DIAGNOSIS — R68.2 DRY MOUTH AND EYES: ICD-10-CM

## 2023-06-29 DIAGNOSIS — H04.123 DRY MOUTH AND EYES: ICD-10-CM

## 2023-06-29 PROCEDURE — 3079F DIAST BP 80-89 MM HG: CPT | Performed by: FAMILY MEDICINE

## 2023-06-29 PROCEDURE — 3074F SYST BP LT 130 MM HG: CPT | Performed by: FAMILY MEDICINE

## 2023-06-29 PROCEDURE — 99214 OFFICE O/P EST MOD 30 MIN: CPT | Performed by: FAMILY MEDICINE

## 2023-06-29 RX ORDER — AZELASTINE 1 MG/ML
1 SPRAY, METERED NASAL 2 TIMES DAILY
Qty: 30 ML | Refills: 1 | Status: SHIPPED | OUTPATIENT
Start: 2023-06-29 | End: 2023-07-25

## 2023-06-29 RX ORDER — PILOCARPINE HYDROCHLORIDE 5 MG/1
5 TABLET, FILM COATED ORAL 3 TIMES DAILY
Qty: 120 TABLET | Refills: 3 | Status: SHIPPED | OUTPATIENT
Start: 2023-06-29 | End: 2023-11-28

## 2023-06-29 RX ORDER — MONTELUKAST SODIUM 10 MG/1
10 TABLET ORAL DAILY
Qty: 90 TABLET | Refills: 3 | Status: SHIPPED | OUTPATIENT
Start: 2023-06-29 | End: 2023-11-28

## 2023-06-29 ASSESSMENT — ENCOUNTER SYMPTOMS
CHILLS: 0
FEVER: 0

## 2023-06-29 ASSESSMENT — FIBROSIS 4 INDEX: FIB4 SCORE: .624

## 2023-06-29 NOTE — PATIENT INSTRUCTIONS
[facility]  [address]  [place] [address]  Phone: [phone]

## 2023-06-29 NOTE — PROGRESS NOTES
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    History of present illness: Jyo Staples is a 53 y.o. female who presented for worsening symptoms    She reports that her dry eyes and dry mouth are getting more worse.  Her blood work showed SCL 70 antibody positive other blood test was negative.  Her referral got processed to rheumatology in [place].  Referral at [facility] was declined.    She reports that she is not able to open her eyes because of dry eyes and she feels fatigued and tired also.  She has allergies also and takes Claritin daily.  She has postnasal drip but feels that her mouth is always dry and drinking a lot of water.      Review of systems:     Review of Systems   Constitutional:  Negative for chills and fever.   HENT:          Dryness in mouth, postnasal drip   Eyes:         Dry eyes.        Medications and Allergies:     Current Outpatient Medications   Medication Sig Dispense Refill    montelukast (SINGULAIR) 10 MG Tab Take 1 Tablet by mouth every day. 90 Tablet 3    azelastine (ASTELIN) 137 MCG/SPRAY nasal spray Administer 1 Spray into affected nostril(S) 2 times a day. 30 mL 1    pilocarpine (SALAGEN) 5 MG Tab Take 1 Tablet by mouth 3 times a day. 120 Tablet 3    estradiol (CLIMARA) 0.1 MG/24HR PATCH WEEKLY APPLY 1 PATCH TO SKIN TRANSDERMAL ONCE WEEKLY       No current facility-administered medications for this visit.          Vitals:    /82 (BP Location: Left arm, Patient Position: Sitting, BP Cuff Size: Adult)   Pulse 62   Temp 36.4 °C (97.6 °F) (Temporal)   Resp 12   Ht 1.6 m (5' 3\")   Wt 74 kg (163 lb 2.3 oz)   SpO2 97%  Body mass index is 28.9 kg/m².    Physical Exam:     Physical Exam  Constitutional:       Appearance: Normal appearance. She is well-developed and well-groomed.   HENT:      Head: " Normocephalic and atraumatic.      Right Ear: External ear normal.      Left Ear: External ear normal.   Eyes:      General:         Right eye: No discharge.         Left eye: No discharge.      Conjunctiva/sclera: Conjunctivae normal.   Cardiovascular:      Rate and Rhythm: Normal rate.   Pulmonary:      Effort: Pulmonary effort is normal. No respiratory distress.   Musculoskeletal:      Cervical back: Neck supple.   Skin:     Findings: No rash.   Neurological:      Mental Status: She is alert.   Psychiatric:         Mood and Affect: Mood and affect normal.         Behavior: Behavior normal.            Assessment/Plan:     1. Dry mouth and eyes  New problem, unstable, start pilocarpine 1 tablet  3 times daily for dry mouth.  Recommended to use refresh eyedrops.  Follow-up with rheumatology.    - pilocarpine (SALAGEN) 5 MG Tab; Take 1 Tablet by mouth 3 times a day.  Dispense: 120 Tablet; Refill: 3    2. 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 Seasonal allergies  Chronic problem, unstable, start singular 10 mg daily.  Start azelastine nasal spray for postnasal drip.    - montelukast (SINGULAIR) 10 MG Tab; Take 1 Tablet by mouth every day.  Dispense: 90 Tablet; Refill: 3  - azelastine (ASTELIN) 137 MCG/SPRAY nasal spray; Administer 1 Spray into affected nostril(S) 2 times a day.  Dispense: 30 mL; Refill: 1        Please note that this dictation was created using voice recognition software. I have made every reasonable attempt to correct obvious errors, but I expect that there are errors of grammar and possibly content that I did not discover before finalizing the note.    Follow up in November 2023.

## 2023-06-30 NOTE — TELEPHONE ENCOUNTER
Patient called [facility] stating she forgot to ask you if you can prescribe her a medication to help with all of her itching issues?Please let patient know via MyChart if there is a medication you can send.

## 2023-07-12 ENCOUNTER — TELEPHONE (OUTPATIENT)
Dept: MEDICAL GROUP | Facility: MEDICAL CENTER | Age: 53
End: 2023-07-12
Payer: COMMERCIAL

## 2023-07-12 DIAGNOSIS — R76.8 SCL-70 ANTIBODY POSITIVE: ICD-10-CM

## 2023-07-12 DIAGNOSIS — R68.2 DRY MOUTH AND EYES: ICD-10-CM

## 2023-07-12 DIAGNOSIS — H04.123 DRY MOUTH AND EYES: ICD-10-CM

## 2023-07-12 NOTE — TELEPHONE ENCOUNTER
Pt called asking for a referral to [facility] Rheumatology/allergy/clinical immunology. Phone number is: [phone] , [facility] [address]

## 2023-07-25 DIAGNOSIS — J30.2 SEASONAL ALLERGIES: ICD-10-CM

## 2023-07-25 RX ORDER — AZELASTINE HYDROCHLORIDE 137 UG/1
1 SPRAY, METERED NASAL 2 TIMES DAILY
Qty: 30 ML | Refills: 1 | Status: SHIPPED | OUTPATIENT
Start: 2023-07-25 | End: 2023-11-28

## 2023-08-01 ENCOUNTER — TELEPHONE (OUTPATIENT)
Dept: MEDICAL GROUP | Facility: MEDICAL CENTER | Age: 53
End: 2023-08-01
Payer: COMMERCIAL

## 2023-08-01 DIAGNOSIS — H04.123 DRY MOUTH AND EYES: ICD-10-CM

## 2023-08-01 DIAGNOSIS — R68.2 DRY MOUTH AND EYES: ICD-10-CM

## 2023-08-01 DIAGNOSIS — R76.8 SCL-70 ANTIBODY POSITIVE: ICD-10-CM

## 2023-08-01 NOTE — TELEPHONE ENCOUNTER
PT would like for you to reorder these labs as she is going to see a Rheumatologist in [place].  DANA IGG E MAGDALENO W/RFLX TO DANA IGG IFA and  ANTI-HISTONE ABS

## 2023-08-02 ENCOUNTER — HOSPITAL ENCOUNTER (OUTPATIENT)
Dept: LAB | Facility: MEDICAL CENTER | Age: 53
End: 2023-08-02
Attending: FAMILY MEDICINE
Payer: COMMERCIAL

## 2023-08-02 DIAGNOSIS — H04.123 DRY MOUTH AND EYES: ICD-10-CM

## 2023-08-02 DIAGNOSIS — R68.2 DRY MOUTH AND EYES: ICD-10-CM

## 2023-08-02 DIAGNOSIS — E55.9 VITAMIN D INSUFFICIENCY: ICD-10-CM

## 2023-08-02 DIAGNOSIS — E53.8 VITAMIN B12 DEFICIENCY: ICD-10-CM

## 2023-08-02 DIAGNOSIS — R73.03 PREDIABETES: ICD-10-CM

## 2023-08-02 DIAGNOSIS — R76.8 SCL-70 ANTIBODY POSITIVE: ICD-10-CM

## 2023-08-02 DIAGNOSIS — E78.2 MIXED HYPERLIPIDEMIA: ICD-10-CM

## 2023-08-02 LAB
25(OH)D3 SERPL-MCNC: 44 NG/ML (ref 30–100)
ALBUMIN SERPL BCP-MCNC: 4.2 G/DL (ref 3.2–4.9)
ALBUMIN/GLOB SERPL: 1.3 G/DL
ALP SERPL-CCNC: 62 U/L (ref 30–99)
ALT SERPL-CCNC: 12 U/L (ref 2–50)
ANION GAP SERPL CALC-SCNC: 10 MMOL/L (ref 7–16)
AST SERPL-CCNC: 16 U/L (ref 12–45)
BILIRUB SERPL-MCNC: 0.5 MG/DL (ref 0.1–1.5)
BUN SERPL-MCNC: 14 MG/DL (ref 8–22)
CALCIUM ALBUM COR SERPL-MCNC: 9.2 MG/DL (ref 8.5–10.5)
CALCIUM SERPL-MCNC: 9.4 MG/DL (ref 8.5–10.5)
CHLORIDE SERPL-SCNC: 102 MMOL/L (ref 96–112)
CHOLEST SERPL-MCNC: 176 MG/DL (ref 100–199)
CO2 SERPL-SCNC: 26 MMOL/L (ref 20–33)
CREAT SERPL-MCNC: 0.81 MG/DL (ref 0.5–1.4)
EST. AVERAGE GLUCOSE BLD GHB EST-MCNC: 126 MG/DL
GFR SERPLBLD CREATININE-BSD FMLA CKD-EPI: 87 ML/MIN/1.73 M 2
GLOBULIN SER CALC-MCNC: 3.3 G/DL (ref 1.9–3.5)
GLUCOSE SERPL-MCNC: 93 MG/DL (ref 65–99)
HBA1C MFR BLD: 6 % (ref 4–5.6)
HDLC SERPL-MCNC: 54 MG/DL
LDLC SERPL CALC-MCNC: 103 MG/DL
POTASSIUM SERPL-SCNC: 4.2 MMOL/L (ref 3.6–5.5)
PROT SERPL-MCNC: 7.5 G/DL (ref 6–8.2)
SODIUM SERPL-SCNC: 138 MMOL/L (ref 135–145)
TRIGL SERPL-MCNC: 95 MG/DL (ref 0–149)
VIT B12 SERPL-MCNC: 528 PG/ML (ref 211–911)

## 2023-08-02 PROCEDURE — 86038 ANTINUCLEAR ANTIBODIES: CPT

## 2023-08-02 PROCEDURE — 80053 COMPREHEN METABOLIC PANEL: CPT

## 2023-08-02 PROCEDURE — 83516 IMMUNOASSAY NONANTIBODY: CPT

## 2023-08-02 PROCEDURE — 82306 VITAMIN D 25 HYDROXY: CPT

## 2023-08-02 PROCEDURE — 80061 LIPID PANEL: CPT

## 2023-08-02 PROCEDURE — 83036 HEMOGLOBIN GLYCOSYLATED A1C: CPT

## 2023-08-02 PROCEDURE — 82607 VITAMIN B-12: CPT

## 2023-08-02 PROCEDURE — 36415 COLL VENOUS BLD VENIPUNCTURE: CPT

## 2023-08-04 LAB — NUCLEAR IGG SER QL IA: NORMAL

## 2023-08-05 LAB — HISTONE IGG SER IA-ACNC: 0.2 UNITS (ref 0–0.9)

## 2023-11-28 ENCOUNTER — OFFICE VISIT (OUTPATIENT)
Dept: MEDICAL GROUP | Facility: MEDICAL CENTER | Age: 53
End: 2023-11-28
Payer: COMMERCIAL

## 2023-11-28 VITALS
HEIGHT: 63 IN | HEART RATE: 64 BPM | RESPIRATION RATE: 17 BRPM | SYSTOLIC BLOOD PRESSURE: 112 MMHG | BODY MASS INDEX: 28.52 KG/M2 | DIASTOLIC BLOOD PRESSURE: 60 MMHG | TEMPERATURE: 97.2 F | WEIGHT: 160.94 LBS | OXYGEN SATURATION: 97 %

## 2023-11-28 DIAGNOSIS — R73.03 PREDIABETES: ICD-10-CM

## 2023-11-28 DIAGNOSIS — E78.2 MIXED HYPERLIPIDEMIA: ICD-10-CM

## 2023-11-28 DIAGNOSIS — L65.9 HAIR LOSS: ICD-10-CM

## 2023-11-28 LAB
HBA1C MFR BLD: 5.6 % (ref ?–5.8)
POCT INT CON NEG: NEGATIVE
POCT INT CON POS: POSITIVE

## 2023-11-28 PROCEDURE — 83036 HEMOGLOBIN GLYCOSYLATED A1C: CPT | Performed by: FAMILY MEDICINE

## 2023-11-28 PROCEDURE — 99214 OFFICE O/P EST MOD 30 MIN: CPT | Performed by: FAMILY MEDICINE

## 2023-11-28 PROCEDURE — 3074F SYST BP LT 130 MM HG: CPT | Performed by: FAMILY MEDICINE

## 2023-11-28 PROCEDURE — 3078F DIAST BP <80 MM HG: CPT | Performed by: FAMILY MEDICINE

## 2023-11-28 ASSESSMENT — ENCOUNTER SYMPTOMS
CHILLS: 0
PALPITATIONS: 0
FEVER: 0

## 2023-11-28 ASSESSMENT — FIBROSIS 4 INDEX: FIB4 SCORE: 0.64

## 2023-11-28 NOTE — LETTER
W[place]it  Checo Scott M.D.  [address]  [place] [address]  Fax: [phone]   Authorization for Release/Disclosure of   Protected Health Information   Name: JOE STAPLES : 1970 SSN: [id number]   Address: [id number]  [place] [address] Phone:    [phone] (home)    I authorize the entity listed below to release/disclose the PHI below to:   [facility]/Checo Scott M.D. and Checo Scott M.D.   Provider or Entity Name:     Address   City, State, Zip   Phone:      Fax:     Reason for request: continuity of care   Information to be released:    [  ] LAST COLONOSCOPY,  including any PATH REPORT and follow-up  [  ] LAST FIT/COLOGUARD RESULT [  ] LAST DEXA  [  ] LAST MAMMOGRAM  [  ] LAST PAP  [  ] LAST LABS [  ] RETINA EXAM REPORT  [  ] IMMUNIZATION RECORDS  [  ] Release all info      [  ] Check here and initial the line next to each item to release ALL health information INCLUDING  _____ Care and treatment for drug and / or alcohol abuse  _____ HIV testing, infection status, or AIDS  _____ Genetic Testing    DATES OF SERVICE OR TIME PERIOD TO BE DISCLOSED: _____________  I understand and acknowledge that:  * This Authorization may be revoked at any time by you in writing, except if your health information has already been used or disclosed.  * Your health information that will be used or disclosed as a result of you signing this authorization could be re-disclosed by the recipient. If this occurs, your re-disclosed health information may no longer be protected by State or Federal laws.  * You may refuse to sign this Authorization. Your refusal will not affect your ability to obtain treatment.  * This Authorization becomes effective upon signing and will  on (date) __________.      If no date is indicated, this Authorization will  one (1) year from the signature date.    Name: Joe Staples  Signature: Date:   2023     PLEASE FAX  REQUESTED RECORDS BACK TO: [phone]

## 2023-11-28 NOTE — PROGRESS NOTES
"FAMILY MEDICINE VISIT                                                               Chief complaint::Diagnoses of Hair loss, Prediabetes, and Mixed hyperlipidemia were pertinent to this visit.    History of present illness: Joy Staples is a 53 y.o. female who presented for lab follow-up    Problem   Hair Loss    She is having hair loss.  She was having dry eyes, dry mouth and Scl 70 antibody came back positive.  Other test came back normal.  She was seen by rheumatology in [place] and they said that she does not have autoimmune condition, this test was likely false positive.  Thyroid function test previously normal     Prediabetes    Patient reports that she talked to her endocrinologist of her son and they recommended to get checked for antibodies as COVID infection has cause type 1 diabetes.  She requested to be tested for type 1 diabetes antibodies    Lab Results   Component Value Date/Time    HBA1C 5.6 11/28/2023 09:37 AM   Her A1c came back at 5.6 which improved from previous numbers.  She has been exercising     Mixed Hyperlipidemia    Recent lipid panel showed improvement in LDL level    Lab Results   Component Value Date/Time    CHOLSTRLTOT 176 08/02/2023 1230    TRIGLYCERIDE 95 08/02/2023 1230    HDL 54 08/02/2023 1230     (H) 08/02/2023 1230                  Review of systems:     Review of Systems   Constitutional:  Negative for chills and fever.   Eyes:         Dry eyes   Cardiovascular:  Negative for chest pain, palpitations and leg swelling.        Medications and Allergies:     Current Outpatient Medications   Medication Sig Dispense Refill    estradiol (CLIMARA) 0.1 MG/24HR PATCH WEEKLY APPLY 1 PATCH TO SKIN TRANSDERMAL ONCE WEEKLY       No current facility-administered medications for this visit.          Vitals:    /60   Pulse 64   Temp 36.2 °C (97.2 °F)   Resp 17   Ht 1.6 m (5' 3\")   Wt 73 kg (160 lb 15 oz)   SpO2 97%  Body mass index is 28.51 kg/m².    Physical Exam: "     Physical Exam  Constitutional:       Appearance: Normal appearance. She is well-developed and well-groomed.   HENT:      Head: Normocephalic and atraumatic.      Right Ear: External ear normal.      Left Ear: External ear normal.   Eyes:      General:         Right eye: No discharge.         Left eye: No discharge.      Conjunctiva/sclera: Conjunctivae normal.   Cardiovascular:      Rate and Rhythm: Normal rate.   Pulmonary:      Effort: Pulmonary effort is normal. No respiratory distress.   Musculoskeletal:      Cervical back: Neck supple.   Skin:     Findings: No rash.   Neurological:      Mental Status: She is alert.   Psychiatric:         Mood and Affect: Mood and affect normal.         Behavior: Behavior normal.          Labs:  I reviewed with patient recent labs resulted on 8/2/2023    Assessment/Plan:         Problem List Items Addressed This Visit       Mixed hyperlipidemia     Chronic problem, mild elevated LDL level, recheck labs in 6 months         Relevant Orders    Lipid Profile    Prediabetes     Chronic problem, stable, continue to exercise.  Recheck labs in 6-month         Relevant Orders    POCT  A1C (Completed)    Comp Metabolic Panel    HEMOGLOBIN A1C    ABI-65    INSULIN AND C-PEPTIDE, SERUM    Hair loss     Chronic problem, unstable, referral to dermatology for further evaluation for hair loss.  We will recheck her thyroid function test         Relevant Orders    TSH WITH REFLEX TO FT4    Referral to Dermatology        Please note that this dictation was created using voice recognition software. I have made every reasonable attempt to correct obvious errors, but I expect that there are errors of grammar and possibly content that I did not discover before finalizing the note.    Follow up in 6 months for lab follow-up.

## 2023-11-28 NOTE — ASSESSMENT & PLAN NOTE
Chronic problem, unstable, referral to dermatology for further evaluation for hair loss.  We will recheck her thyroid function test

## 2024-01-12 ENCOUNTER — TELEPHONE (OUTPATIENT)
Dept: MEDICAL GROUP | Facility: MEDICAL CENTER | Age: 54
End: 2024-01-12
Payer: COMMERCIAL

## 2024-01-12 NOTE — TELEPHONE ENCOUNTER
1. Name: Joy Staples    Call Back Number: [phone] (home)       How would the patient prefer to be contacted with a response: Phone call OK to leave a detailed message    Patient states she got a new ins so she needs a new referral to her gyno but I didn't know if she needed an appt or not since she has been continuously going to this doctor. Please call pt back to let her know if we can send it out or not.

## 2024-01-25 ENCOUNTER — OFFICE VISIT (OUTPATIENT)
Dept: DERMATOLOGY | Facility: IMAGING CENTER | Age: 54
End: 2024-01-25
Payer: COMMERCIAL

## 2024-01-25 DIAGNOSIS — L85.3 XEROSIS OF SKIN: ICD-10-CM

## 2024-01-25 DIAGNOSIS — L65.9 ALOPECIA: ICD-10-CM

## 2024-01-25 PROCEDURE — 99204 OFFICE O/P NEW MOD 45 MIN: CPT | Performed by: DERMATOLOGY

## 2024-01-25 NOTE — PROGRESS NOTES
CC: Establish Care and Hair Loss       Registered for appt 9 minutes late - seen as a courtesy.    Subjective: New patient here for hair loss and dry lips.    Hairloss location: scalp  Any other symptoms: none   Any dietary restrictions? No restrictions   Any family history of hair thinning/balding in women or men: None   Any illnesses, infections, pregnancy, or high stress 3-9 months preceding loss: high stress, none  Any labs done? Had labs in 08/2023    Treatments tried: none.    Also dry lips x 2- mths uses chapstick, nothing makes worse that she's noticed.       ROS: no fevers/chills. No itch.  No shortness of breath. No cough. Dry eyes/lips/mouth.  Relevant PMH:ABI  Social:NS    PE: Gen:WDWN female in NAD. Skin: hair distribution on scalp appearing WNL - dense and curly. No redness, scaling. Hair pull test negative. Lips without xerosis noted today. No tapered digits/ulcerations. No oral aperture narrowing noted.    Labs: Aug 2023: CMP - WNL, Vit D>40, DAAN - WNL  April 2024: Anti-SCL-70 - elevated 121  Ferritin - 97.3  CBC - WNL    A/P: alopecia: consider age-related androgenetic alopecia a consideration:   -advised for monitoring for progression/worsening  -reviewed Rogaine for men, use  -reviewed hair nutrition - Vit D, iron, biotin  -consider repeat labs for worsening symptoms - thyroid disease reviewed    Xerosis of lips:   -advised symptomatic management - lip balms/hydration    Hx of previous SCL-70 Ab elevation:  -reviewed signs/sx of scleroderma and to f/u PCP for possible repeat testing (consider false positive) vs early marker of disease in evolution?    To monitor for additional signs/sx of AI disease and f/u PCP PRN    F/u PRN    I have reviewed medications relevant to my specialty.

## 2024-02-13 ENCOUNTER — APPOINTMENT (OUTPATIENT)
Dept: MEDICAL GROUP | Facility: MEDICAL CENTER | Age: 54
End: 2024-02-13
Payer: COMMERCIAL

## 2024-02-23 ENCOUNTER — OFFICE VISIT (OUTPATIENT)
Dept: MEDICAL GROUP | Facility: MEDICAL CENTER | Age: 54
End: 2024-02-23
Payer: COMMERCIAL

## 2024-02-23 VITALS
WEIGHT: 160.94 LBS | OXYGEN SATURATION: 96 % | SYSTOLIC BLOOD PRESSURE: 118 MMHG | HEIGHT: 63 IN | HEART RATE: 82 BPM | TEMPERATURE: 97.4 F | DIASTOLIC BLOOD PRESSURE: 60 MMHG | BODY MASS INDEX: 28.52 KG/M2 | RESPIRATION RATE: 16 BRPM

## 2024-02-23 DIAGNOSIS — Z12.4 SCREENING FOR CERVICAL CANCER: ICD-10-CM

## 2024-02-23 DIAGNOSIS — R53.83 OTHER FATIGUE: ICD-10-CM

## 2024-02-23 DIAGNOSIS — L29.0 ANAL ITCHING: ICD-10-CM

## 2024-02-23 DIAGNOSIS — F43.9 STRESS: ICD-10-CM

## 2024-02-23 DIAGNOSIS — J35.1 ENLARGED TONSILS: ICD-10-CM

## 2024-02-23 PROCEDURE — 99214 OFFICE O/P EST MOD 30 MIN: CPT | Performed by: FAMILY MEDICINE

## 2024-02-23 PROCEDURE — 3078F DIAST BP <80 MM HG: CPT | Performed by: FAMILY MEDICINE

## 2024-02-23 PROCEDURE — 3074F SYST BP LT 130 MM HG: CPT | Performed by: FAMILY MEDICINE

## 2024-02-23 RX ORDER — HYDROCORTISONE ACETATE 25 MG/1
25 SUPPOSITORY RECTAL EVERY 12 HOURS
Qty: 24 SUPPOSITORY | Refills: 2 | Status: SHIPPED
Start: 2024-02-23

## 2024-02-23 ASSESSMENT — FIBROSIS 4 INDEX: FIB4 SCORE: 0.64

## 2024-02-23 ASSESSMENT — ENCOUNTER SYMPTOMS
CHILLS: 0
FEVER: 0

## 2024-02-23 ASSESSMENT — PATIENT HEALTH QUESTIONNAIRE - PHQ9
SUM OF ALL RESPONSES TO PHQ QUESTIONS 1-9: 4
5. POOR APPETITE OR OVEREATING: 0 - NOT AT ALL
CLINICAL INTERPRETATION OF PHQ2 SCORE: 2

## 2024-02-23 NOTE — PROGRESS NOTES
Verbal consent was acquired by the patient to use [email or web address] ambient listening note generation during this visit.      Joy was seen today for follow-up.    Diagnoses and all orders for this visit:    Enlarged tonsils  -     Referral to ENT    Other fatigue    Anal itching    Screening for cervical cancer  -     Referral to Gynecology    Stress    Other orders  -     hydrocortisone (ANUSOL-HC) 25 MG Suppos; Insert 1 Suppository into the rectum every 12 hours.                  Assessment & Plan  1. Enlarged tonsils.  This is a new condition, unstable. I will send her to ENT for further evaluation for this.    2. Fatigue.  It is a new condition, unstable. This is likely due to dehydration. I recommended the patient to drink more water. I will check labs with the next blood test. I added thyroid function test to next blood test.    3. Anal itching.  This is a new condition, unstable, likely due to constipation. I will add fiber and drink more fluid daily. I will start with hydrocortisone suppositories to help with EOE.    4. Regular gynecological screening.  I referred the patient to gynecology.    5. Stress.  This is a new condition. It is unstable. She has a lot of things going on at home. She will monitor symptoms closely, do yoga and meditation.    Follow-up  The patient will follow up in 3 months.          Chief complaint::Diagnoses of Enlarged tonsils, Other fatigue, Anal itching, Screening for cervical cancer, and Stress were pertinent to this visit.      History of Present Illness  The patient is a 53-year-old female who is here to discuss about different concerns.    She has been sleepy a lot, even when she is driving early in the morning.    She has a little itchiness on her anus. She had a colonoscopy 2 years ago at [facility]. She denies any blood in her stools. She is constipated, but she thinks it is because she does not drink enough water.    She was told by a doctor in [place] that she had  "enlarged lymph nodes. One of her tonsils is big. A lot of the food that she eats gets stuck in her throat. One day, she felt something in her throat that was bothering her. She pushed it and a lot of food came out.    She has some depression symptoms. She has a lot going on in her house. Her youngest son stopped going to school and started working part-time. She broke up with her boyfriend.   She went to see a dermatologist, who did not see anything wrong with her. She told her about the tests.      Review of Systems   Constitutional:  Positive for malaise/fatigue. Negative for chills and fever.   Gastrointestinal:         Anal itching, constipation          Medications and Allergies:     Current Outpatient Medications   Medication Sig Dispense Refill    hydrocortisone (ANUSOL-HC) 25 MG Suppos Insert 1 Suppository into the rectum every 12 hours. 24 Suppository 2     No current facility-administered medications for this visit.       /60   Pulse 82   Temp 36.3 °C (97.4 °F)   Resp 16   Ht 1.6 m (5' 3\")   Wt 73 kg (160 lb 15 oz)   SpO2 96% , Body mass index is 28.51 kg/m².      Physical Exam  Constitutional:       Appearance: Normal appearance. She is well-developed and well-groomed.   HENT:      Head: Normocephalic and atraumatic.      Right Ear: External ear normal.      Left Ear: External ear normal.      Mouth/Throat:      Tonsils: 2+ on the right. 2+ on the left.   Eyes:      General:         Right eye: No discharge.         Left eye: No discharge.      Conjunctiva/sclera: Conjunctivae normal.   Cardiovascular:      Rate and Rhythm: Normal rate.   Pulmonary:      Effort: Pulmonary effort is normal. No respiratory distress.   Musculoskeletal:      Cervical back: Neck supple.   Skin:     Findings: No rash.   Neurological:      Mental Status: She is alert.   Psychiatric:         Mood and Affect: Mood and affect normal.         Behavior: Behavior normal.              Results            Please note that this " dictation was created using voice recognition software. I have made every reasonable attempt to correct obvious errors, but I expect that there are errors of grammar and possibly content that I did not discover before finalizing the note.

## 2024-04-02 ENCOUNTER — OFFICE VISIT (OUTPATIENT)
Dept: URGENT CARE | Facility: CLINIC | Age: 54
End: 2024-04-02
Payer: COMMERCIAL

## 2024-04-02 VITALS
DIASTOLIC BLOOD PRESSURE: 86 MMHG | TEMPERATURE: 96.5 F | BODY MASS INDEX: 29.23 KG/M2 | HEIGHT: 63 IN | RESPIRATION RATE: 14 BRPM | SYSTOLIC BLOOD PRESSURE: 116 MMHG | HEART RATE: 64 BPM | OXYGEN SATURATION: 95 % | WEIGHT: 165 LBS

## 2024-04-02 DIAGNOSIS — H57.12 PAIN OF LEFT EYE: ICD-10-CM

## 2024-04-02 PROCEDURE — 3079F DIAST BP 80-89 MM HG: CPT | Performed by: PHYSICIAN ASSISTANT

## 2024-04-02 PROCEDURE — 99213 OFFICE O/P EST LOW 20 MIN: CPT | Performed by: PHYSICIAN ASSISTANT

## 2024-04-02 PROCEDURE — 2023F DILAT RTA XM W/O RTNOPTHY: CPT | Performed by: PHYSICIAN ASSISTANT

## 2024-04-02 PROCEDURE — 3074F SYST BP LT 130 MM HG: CPT | Performed by: PHYSICIAN ASSISTANT

## 2024-04-02 RX ORDER — ERYTHROMYCIN 5 MG/G
1 OINTMENT OPHTHALMIC 4 TIMES DAILY
Qty: 3.5 G | Refills: 0 | Status: SHIPPED | OUTPATIENT
Start: 2024-04-02 | End: 2024-04-09

## 2024-04-02 ASSESSMENT — ENCOUNTER SYMPTOMS
EYE PAIN: 1
BLURRED VISION: 0
EYE REDNESS: 0
DOUBLE VISION: 0
EYE DISCHARGE: 0
PHOTOPHOBIA: 0

## 2024-04-02 ASSESSMENT — FIBROSIS 4 INDEX: FIB4 SCORE: 0.65

## 2024-04-03 NOTE — PROGRESS NOTES
"  Subjective:     Joy Staples  is a 54 y.o. female who presents for Other (Feels like something is in L eye x 2 days )       She presents today with pulmonary sensation and discomfort over the left eye that has been ongoing over the last 2 to 3 days.  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  She states that she has been experiencing an area of redness over the tear duct.  Denies any foreign body sensation.  No change in vision or blurry vision.  Does not wear contacts.       Review of Systems   Eyes:  Positive for pain. Negative for blurred vision, double vision, photophobia, discharge and redness.      No Known Allergies  Past Medical History:   Diagnosis Date    BPPV (benign paroxysmal positional vertigo) 7/29/2016    Colloid cyst of brain (HCC)     s/p resection 2005        Objective:   /86   Pulse 64   Temp 35.8 °C (96.5 °F) (Temporal)   Resp 14   Ht 1.6 m (5' 3\")   Wt 74.8 kg (165 lb)   LMP 12/14/2018   SpO2 95%   BMI 29.23 kg/m²   Physical Exam  Vitals and nursing note reviewed.   Constitutional:       General: She is not in acute distress.     Appearance: She is not ill-appearing or toxic-appearing.   HENT:      Head: Normocephalic.      Nose: No rhinorrhea.   Eyes:      General: No scleral icterus.        Right eye: No discharge.         Left eye: No discharge.      Extraocular Movements: Extraocular movements intact.      Conjunctiva/sclera: Conjunctivae normal.      Pupils: Pupils are equal, round, and reactive to light.        Comments: Fluorescein dye examination stain of the left eye was negative for fluorescein dye uptake, no evidence of corneal abrasion or other corneal pathology.  Negative Alia sign.  Visual examination does reveal an area of erythema and a small mass over the above marked region on the skin of the lacrimal caruncle.  Area does appear to be a hematoma based on " visual exam   Pulmonary:      Effort: Pulmonary effort is normal. No respiratory distress.      Breath sounds: No stridor.   Musculoskeletal:      Cervical back: Neck supple.   Neurological:      Mental Status: She is alert and oriented to person, place, and time.   Psychiatric:         Mood and Affect: Mood normal.         Behavior: Behavior normal.         Thought Content: Thought content normal.         Judgment: Judgment normal.             Diagnostic testing: None    Assessment/Plan:     Encounter Diagnoses   Name Primary?    Pain of left eye           Plan for care for today's complaint includes start the patient empirically on erythromycin ointment due to ongoing discomfort and injury occurring when splashing plant water into her eye.  No evidence of corneal abrasion or corneal pathology on exam today.  Discussed with the patient her symptoms do appear to be related to a hematoma over the left eye lacrimal caruncle tissue.  Continue to monitor symptoms and return to urgent care or follow-up with primary care provider if symptoms remain ongoing.  Follow-up in the emergency department if symptoms become severe, ER precautions discussed in office today..  Prescription for erythromycin ointment provided.    See AVS Instructions below for written guidance provided to patient on after-visit management and care in addition to our verbal discussion during the visit.    Please note that this dictation was created using voice recognition software. I have attempted to correct all errors, but there may be sound-alike, spelling, grammar and possibly content errors that I did not discover before finalizing the note.    Clement Hale PA-C

## 2024-05-21 ENCOUNTER — APPOINTMENT (OUTPATIENT)
Dept: MEDICAL GROUP | Facility: MEDICAL CENTER | Age: 54
End: 2024-05-21
Payer: COMMERCIAL

## 2024-07-05 ENCOUNTER — OFFICE VISIT (OUTPATIENT)
Dept: MEDICAL GROUP | Facility: MEDICAL CENTER | Age: 54
End: 2024-07-05
Payer: COMMERCIAL

## 2024-07-05 VITALS
OXYGEN SATURATION: 96 % | WEIGHT: 169.2 LBS | SYSTOLIC BLOOD PRESSURE: 108 MMHG | HEIGHT: 63 IN | HEART RATE: 72 BPM | BODY MASS INDEX: 29.98 KG/M2 | DIASTOLIC BLOOD PRESSURE: 68 MMHG | TEMPERATURE: 97.9 F

## 2024-07-05 DIAGNOSIS — M79.671 FOOT PAIN, BILATERAL: ICD-10-CM

## 2024-07-05 DIAGNOSIS — M79.672 FOOT PAIN, BILATERAL: ICD-10-CM

## 2024-07-05 DIAGNOSIS — R21 SKIN RASH: ICD-10-CM

## 2024-07-05 DIAGNOSIS — M25.561 ACUTE PAIN OF BOTH KNEES: ICD-10-CM

## 2024-07-05 DIAGNOSIS — T78.40XA ALLERGY, INITIAL ENCOUNTER: ICD-10-CM

## 2024-07-05 DIAGNOSIS — Z12.11 COLON CANCER SCREENING: ICD-10-CM

## 2024-07-05 DIAGNOSIS — M25.562 ACUTE PAIN OF BOTH KNEES: ICD-10-CM

## 2024-07-05 PROCEDURE — 99214 OFFICE O/P EST MOD 30 MIN: CPT | Performed by: FAMILY MEDICINE

## 2024-07-05 PROCEDURE — 3078F DIAST BP <80 MM HG: CPT | Performed by: FAMILY MEDICINE

## 2024-07-05 PROCEDURE — 3074F SYST BP LT 130 MM HG: CPT | Performed by: FAMILY MEDICINE

## 2024-07-05 ASSESSMENT — ENCOUNTER SYMPTOMS
CHILLS: 0
FEVER: 0

## 2024-07-05 ASSESSMENT — FIBROSIS 4 INDEX: FIB4 SCORE: 0.65

## 2024-07-09 ENCOUNTER — PATIENT MESSAGE (OUTPATIENT)
Dept: MEDICAL GROUP | Facility: MEDICAL CENTER | Age: 54
End: 2024-07-09
Payer: COMMERCIAL

## 2024-07-09 DIAGNOSIS — J35.1 ENLARGED TONSILS: ICD-10-CM

## 2024-07-10 ENCOUNTER — APPOINTMENT (OUTPATIENT)
Dept: RADIOLOGY | Facility: MEDICAL CENTER | Age: 54
End: 2024-07-10
Attending: FAMILY MEDICINE
Payer: COMMERCIAL

## 2024-07-10 DIAGNOSIS — M25.561 ACUTE PAIN OF BOTH KNEES: ICD-10-CM

## 2024-07-10 DIAGNOSIS — M25.562 ACUTE PAIN OF BOTH KNEES: ICD-10-CM

## 2024-07-10 PROCEDURE — 73560 X-RAY EXAM OF KNEE 1 OR 2: CPT | Mod: RT

## 2024-07-10 PROCEDURE — 73560 X-RAY EXAM OF KNEE 1 OR 2: CPT | Mod: LT

## 2024-08-26 ENCOUNTER — APPOINTMENT (OUTPATIENT)
Dept: RADIOLOGY | Facility: IMAGING CENTER | Age: 54
End: 2024-08-26
Attending: ORTHOPAEDIC SURGERY
Payer: COMMERCIAL

## 2024-08-26 ENCOUNTER — OFFICE VISIT (OUTPATIENT)
Dept: SURGICAL ONCOLOGY | Facility: MEDICAL CENTER | Age: 54
End: 2024-08-26
Payer: COMMERCIAL

## 2024-08-26 VITALS
OXYGEN SATURATION: 97 % | WEIGHT: 163 LBS | DIASTOLIC BLOOD PRESSURE: 68 MMHG | SYSTOLIC BLOOD PRESSURE: 108 MMHG | HEART RATE: 59 BPM | TEMPERATURE: 97.2 F | HEIGHT: 63 IN | BODY MASS INDEX: 28.88 KG/M2

## 2024-08-26 DIAGNOSIS — M25.561 ACUTE PAIN OF BOTH KNEES: ICD-10-CM

## 2024-08-26 DIAGNOSIS — M25.562 ACUTE PAIN OF BOTH KNEES: ICD-10-CM

## 2024-08-26 DIAGNOSIS — M22.2X1 PATELLOFEMORAL PAIN SYNDROME OF BOTH KNEES: ICD-10-CM

## 2024-08-26 DIAGNOSIS — M22.2X2 PATELLOFEMORAL PAIN SYNDROME OF BOTH KNEES: ICD-10-CM

## 2024-08-26 PROCEDURE — 3074F SYST BP LT 130 MM HG: CPT | Performed by: ORTHOPAEDIC SURGERY

## 2024-08-26 PROCEDURE — 99203 OFFICE O/P NEW LOW 30 MIN: CPT | Performed by: ORTHOPAEDIC SURGERY

## 2024-08-26 PROCEDURE — 73560 X-RAY EXAM OF KNEE 1 OR 2: CPT | Mod: TC,LT | Performed by: ORTHOPAEDIC SURGERY

## 2024-08-26 PROCEDURE — 3078F DIAST BP <80 MM HG: CPT | Performed by: ORTHOPAEDIC SURGERY

## 2024-08-26 ASSESSMENT — FIBROSIS 4 INDEX: FIB4 SCORE: 0.65

## 2024-08-26 ASSESSMENT — ENCOUNTER SYMPTOMS
CHILLS: 0
SENSORY CHANGE: 0
WEAKNESS: 0
FEVER: 0
SHORTNESS OF BREATH: 0
MYALGIAS: 0

## 2024-08-26 NOTE — PROGRESS NOTES
Subjective:   8/26/2024  9:09 AM  Primary care physician:Checo Scott M.D.    Chief Complaint:   Bilateral knee pain     History of presenting illness:  Joy Staples  is a pleasant 54 y.o. female who was referred for evaluation of bilateral knee pain.  She reports she started having knee pain a little over a year ago.  She noticed that when she was doing [employer] classes and since stopping [employer] 1 year ago her knee pain has improved some.  She still enjoys running and notices that her knees hurt more after running.  The pain is primarily in the anterior aspect of the knee.  This is associated with some crepitus, but she denies any locking or giving way episodes.  She also has some paresthesias over the anterior knee on the left side occasionally.  She takes Advil approximately once per week for knee pain.  She otherwise has not tried any bracing, physical therapy or injections.  She does take a glucosamine and chondroitin supplement.    History of diabetes: no  History of tobacco use: Never  History of renal disease: no  Use of anticoagulants: no  Prior surgeries on this limb/joint: no    Past Medical History:   Diagnosis Date    BPPV (benign paroxysmal positional vertigo) 7/29/2016    Colloid cyst of brain (HCC)     s/p resection 2005     Past Surgical History:   Procedure Laterality Date    AZ LAP,APPENDECTOMY  5/16/2021    Procedure: APPENDECTOMY, LAPAROSCOPIC;  Surgeon: Hank Magana M.D.;  Location: SURGERY [facility];  Service: General    APPENDECTOMY  05/16/2021    ABDOMINAL HYSTERECTOMY TOTAL      total    CHOLECYSTECTOMY      CRANIOTOMY       No Known Allergies  Outpatient Encounter Medications as of 8/26/2024   Medication Sig Dispense Refill    estradiol (CLIMARA) 0.1 MG/24HR PATCH WEEKLY 1 PATCH TO SKIN TRANSDERMAL ONCE WEEKLY 90 DAYS       No facility-administered encounter medications on file as of 8/26/2024.     Social History     Socioeconomic History    Marital status: Single     Spouse  "name: Not on file    Number of children: Not on file    Years of education: Not on file    Highest education level: Not on file   Occupational History    Not on file   Tobacco Use    Smoking status: Never    Smokeless tobacco: Never   Vaping Use    Vaping status: Never Used   Substance and Sexual Activity    Alcohol use: Never     Comment: Occasionally    Drug use: Never    Sexual activity: Yes     Comment: Work as ,    Other Topics Concern    Not on file   Social History Narrative          Social Determinants of Health     Financial Resource Strain: Not on file   Food Insecurity: Not on file   Transportation Needs: Not on file   Physical Activity: Not on file   Stress: Not on file   Social Connections: Not on file   Intimate Partner Violence: Not on file   Housing Stability: Not on file      Social History     Tobacco Use   Smoking Status Never   Smokeless Tobacco Never     Social History     Substance and Sexual Activity   Alcohol Use Never    Comment: Occasionally     Social History     Substance and Sexual Activity   Drug Use Never        Family History   Problem Relation Age of Onset    No Known Problems Mother     Heart Disease Father     Diabetes Brother         type 1    Cancer Brother 38        Gleoblastoma    Rheumatologic Disease Sister         RA    Diabetes Son         type 1    Schizophrenia Son        Review of Systems   Constitutional:  Negative for chills and fever.   Respiratory:  Negative for shortness of breath.    Cardiovascular:  Negative for chest pain.   Musculoskeletal:  Positive for joint pain. Negative for myalgias.   Neurological:  Negative for sensory change and weakness.        Objective:   /68 (BP Location: Left arm, Patient Position: Sitting, BP Cuff Size: Adult)   Pulse (!) 59   Temp 36.2 °C (97.2 °F) (Temporal)   Ht 1.6 m (5' 3\")   Wt 73.9 kg (163 lb)   LMP 12/14/2018   SpO2 97%   BMI 28.87 kg/m²     Physical Exam  Constitutional:       General: " She is not in acute distress.     Appearance: Normal appearance. She is not ill-appearing.   HENT:      Head: Normocephalic and atraumatic.      Right Ear: External ear normal.      Left Ear: External ear normal.      Mouth/Throat:      Mouth: Mucous membranes are moist.   Eyes:      Extraocular Movements: Extraocular movements intact.      Conjunctiva/sclera: Conjunctivae normal.   Cardiovascular:      Rate and Rhythm: Normal rate.      Pulses: Normal pulses.   Pulmonary:      Effort: Pulmonary effort is normal.      Breath sounds: No wheezing.   Abdominal:      General: Abdomen is flat. There is no distension.   Musculoskeletal:      Cervical back: Normal range of motion and neck supple.      Comments: right knee: Overlying skin demonstrates no erythema, ecchymoses or wounds. Knee ROM is 0-120.  SILT spn, dpn, plantar and sural nerves. Motor intact to knee extension, ankle dorsiflexion, ankle plantar flexion, and eversion. DP/PT pulse 2+. There is no tenderness at the medial and lateral joint line. There is not tenderness elsewhere. negative Lachman. negative Elham. stable varus/valgus stress. Positive patella grind test    Left knee: Overlying skin demonstrates no erythema, ecchymoses or wounds. Knee ROM is 0-120.  SILT spn, dpn, plantar and sural nerves. Motor intact to knee extension, ankle dorsiflexion, ankle plantar flexion, and eversion. DP/PT pulse 2+. There is no tenderness at the medial and lateral joint line. There is not tenderness elsewhere. negative Lachman. negative Elham. stable varus/valgus stress. Positive patella grind test       Skin:     General: Skin is warm and dry.      Capillary Refill: Capillary refill takes less than 2 seconds.   Neurological:      Mental Status: She is alert and oriented to person, place, and time.   Psychiatric:         Mood and Affect: Mood normal.         Behavior: Behavior normal.           Imaging:  Multiple views of the bilateral knees demonstrates no  degenerative changes. There are no acute fractures or destructive osseous lesions.  There is a Mariia-Stieda lesion of the left knee consistent with old MCL sprain.       Diagnosis:     1. Patellofemoral pain syndrome of both knees  Referral to Physical Therapy              Assessment/Plan:   I counseled the patient regarding the above diagnosis.  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  Her pain is also primarily at the anterior aspect of the knee.  We discussed treatment options including bracing, oral anti-inflammatories and physical therapy.  She would like to try some physical therapy and I have placed referral today.  She was given the prescription for therapy and will take it to a location closer to home that accepts her insurance.  She was given a list of physical therapy offices in town.  If her symptoms fail to improve she will follow-up for further discussion of other treatment options.  All of her questions were answered.      Referrals: Physical therapy  Restrictions: Recommend low impact exercise  New medications/refills: none  Imaging studies: none  Follow-up: as needed      Mohsen Shaw, DO  Orthopedic Oncology and General Orthopedics

## 2024-09-03 ENCOUNTER — APPOINTMENT (OUTPATIENT)
Dept: MEDICAL GROUP | Facility: MEDICAL CENTER | Age: 54
End: 2024-09-03
Payer: COMMERCIAL

## 2024-09-03 VITALS
HEIGHT: 63 IN | TEMPERATURE: 97.6 F | OXYGEN SATURATION: 98 % | SYSTOLIC BLOOD PRESSURE: 110 MMHG | BODY MASS INDEX: 28.52 KG/M2 | DIASTOLIC BLOOD PRESSURE: 62 MMHG | RESPIRATION RATE: 18 BRPM | WEIGHT: 160.94 LBS | HEART RATE: 60 BPM

## 2024-09-03 DIAGNOSIS — Z11.4 SCREENING FOR HIV (HUMAN IMMUNODEFICIENCY VIRUS): ICD-10-CM

## 2024-09-03 DIAGNOSIS — Z11.59 NEED FOR HEPATITIS C SCREENING TEST: ICD-10-CM

## 2024-09-03 DIAGNOSIS — E78.2 MIXED HYPERLIPIDEMIA: ICD-10-CM

## 2024-09-03 DIAGNOSIS — M25.542 ARTHRALGIA OF BOTH HANDS: ICD-10-CM

## 2024-09-03 DIAGNOSIS — R73.03 PREDIABETES: ICD-10-CM

## 2024-09-03 DIAGNOSIS — M25.541 ARTHRALGIA OF BOTH HANDS: ICD-10-CM

## 2024-09-03 DIAGNOSIS — E55.9 VITAMIN D INSUFFICIENCY: ICD-10-CM

## 2024-09-03 DIAGNOSIS — E53.8 VITAMIN B12 DEFICIENCY: ICD-10-CM

## 2024-09-03 DIAGNOSIS — Z01.84 IMMUNITY STATUS TESTING: ICD-10-CM

## 2024-09-03 DIAGNOSIS — M25.50 POLYARTHRALGIA: ICD-10-CM

## 2024-09-03 PROCEDURE — 3078F DIAST BP <80 MM HG: CPT | Performed by: FAMILY MEDICINE

## 2024-09-03 PROCEDURE — 3074F SYST BP LT 130 MM HG: CPT | Performed by: FAMILY MEDICINE

## 2024-09-03 PROCEDURE — 99214 OFFICE O/P EST MOD 30 MIN: CPT | Performed by: FAMILY MEDICINE

## 2024-09-03 ASSESSMENT — ENCOUNTER SYMPTOMS
FEVER: 0
CHILLS: 0

## 2024-09-03 ASSESSMENT — FIBROSIS 4 INDEX: FIB4 SCORE: 0.65

## 2024-09-03 NOTE — PROGRESS NOTES
FAMILY MEDICINE VISIT                                                               Assessment/Plan:       1. Prediabetes  Chronic condition, stable, recheck labs.    Lab Results   Component Value Date/Time    HBA1C 5.6 11/28/2023 09:37 AM       - HEMOGLOBIN A1C; Future  - ABI-65; Future  - INSULIN AND C-PEPTIDE, SERUM    2. Mixed hyperlipidemia  Chronic condition, unstable, recheck below labs.    - Comp Metabolic Panel; Future  - Lipid Profile; Future  - TSH+FREE T4    3. Vitamin D insufficiency  Chronic condition, unstable, recheck below labs.    - VITAMIN D,25 HYDROXY (DEFICIENCY); Future    4. Vitamin B12 deficiency  Chronic condition, unstable, recheck below labs.  - VITAMIN B12; Future    5. Polyarthralgia  Chronic condition, unstable, recheck below labs.  - CONNECTIVE TISSUE DISEASES PROFILE; Future    6. Arthralgia of both hands  Chronic condition, unstable, recheck below xray.    - DX-JOINT SURVEY-HANDS SINGLE VIEW; Future    7. Screening for HIV (human immunodeficiency virus)  - HIV AG/AB COMBO ASSAY SCREENING; Future    8. Need for hepatitis C screening test  - HEP C VIRUS ANTIBODY; Future    9. Immunity status testing  - HEP B SURFACE AB; Future       Follow up in 1 year for annual physical, sooner if lab test results are abnormal.      Chief complaint::Diagnoses of Prediabetes, Mixed hyperlipidemia, Vitamin D insufficiency, Vitamin B12 deficiency, Polyarthralgia, Arthralgia of both hands, Screening for HIV (human immunodeficiency virus), Need for hepatitis C screening test, and Immunity status testing were pertinent to this visit.    History of present illness: Joy Staples is a 54 y.o. female who presented for labs.    She reports that she lost lab papers and would like to get rechecked for labs.  Has history of prediabetes and mixed hyperlipidemia.  Has been getting joint pain we would like to check for connective tissue disorder.  Has pain in her hand joints also.      Review of systems:    "  Review of Systems   Constitutional:  Negative for chills and fever.   Musculoskeletal:  Positive for joint pain.        Medications and Allergies:     Current Outpatient Medications   Medication Sig Dispense Refill    estradiol (CLIMARA) 0.1 MG/24HR PATCH WEEKLY 1 PATCH TO SKIN TRANSDERMAL ONCE WEEKLY 90 DAYS       No current facility-administered medications for this visit.          Vitals:    /62 (BP Location: Left arm, Patient Position: Sitting, BP Cuff Size: Adult)   Pulse 60   Temp 36.4 °C (97.6 °F) (Temporal)   Resp 18   Ht 1.6 m (5' 3\")   Wt 73 kg (160 lb 15 oz)   SpO2 98%  Body mass index is 28.51 kg/m².    Physical Exam:     Physical Exam  Constitutional:       Appearance: Normal appearance. She is well-developed and well-groomed.   HENT:      Head: Normocephalic and atraumatic.      Right Ear: External ear normal.      Left Ear: External ear normal.   Eyes:      General:         Right eye: No discharge.         Left eye: No discharge.      Conjunctiva/sclera: Conjunctivae normal.   Cardiovascular:      Rate and Rhythm: Normal rate.   Pulmonary:      Effort: Pulmonary effort is normal. No respiratory distress.   Musculoskeletal:      Cervical back: Neck supple.      Comments: Mild swelling DIP joint of middle finger of left hand   Skin:     Findings: No rash.   Neurological:      Mental Status: She is alert.   Psychiatric:         Mood and Affect: Mood and affect normal.         Behavior: Behavior normal.                Please note that this dictation was created using voice recognition software. I have made every reasonable attempt to correct obvious errors, but I expect that there are errors of grammar and possibly content that I did not discover before finalizing the note.      "

## 2024-10-10 ENCOUNTER — HOSPITAL ENCOUNTER (OUTPATIENT)
Dept: LAB | Facility: MEDICAL CENTER | Age: 54
End: 2024-10-10
Attending: FAMILY MEDICINE
Payer: COMMERCIAL

## 2024-10-10 DIAGNOSIS — Z11.4 SCREENING FOR HIV (HUMAN IMMUNODEFICIENCY VIRUS): ICD-10-CM

## 2024-10-10 DIAGNOSIS — Z01.84 IMMUNITY STATUS TESTING: ICD-10-CM

## 2024-10-10 DIAGNOSIS — E53.8 VITAMIN B12 DEFICIENCY: ICD-10-CM

## 2024-10-10 DIAGNOSIS — Z11.59 NEED FOR HEPATITIS C SCREENING TEST: ICD-10-CM

## 2024-10-10 DIAGNOSIS — E78.2 MIXED HYPERLIPIDEMIA: ICD-10-CM

## 2024-10-10 DIAGNOSIS — R73.03 PREDIABETES: ICD-10-CM

## 2024-10-10 DIAGNOSIS — E55.9 VITAMIN D INSUFFICIENCY: ICD-10-CM

## 2024-10-10 DIAGNOSIS — M25.50 POLYARTHRALGIA: ICD-10-CM

## 2024-10-10 LAB
25(OH)D3 SERPL-MCNC: 33 NG/ML (ref 30–100)
ALBUMIN SERPL BCP-MCNC: 4.1 G/DL (ref 3.2–4.9)
ALBUMIN/GLOB SERPL: 1.2 G/DL
ALP SERPL-CCNC: 66 U/L (ref 30–99)
ALT SERPL-CCNC: 16 U/L (ref 2–50)
ANION GAP SERPL CALC-SCNC: 10 MMOL/L (ref 7–16)
AST SERPL-CCNC: 20 U/L (ref 12–45)
BILIRUB SERPL-MCNC: 0.5 MG/DL (ref 0.1–1.5)
BUN SERPL-MCNC: 18 MG/DL (ref 8–22)
CALCIUM ALBUM COR SERPL-MCNC: 9.2 MG/DL (ref 8.5–10.5)
CALCIUM SERPL-MCNC: 9.3 MG/DL (ref 8.5–10.5)
CHLORIDE SERPL-SCNC: 102 MMOL/L (ref 96–112)
CHOLEST SERPL-MCNC: 198 MG/DL (ref 100–199)
CO2 SERPL-SCNC: 24 MMOL/L (ref 20–33)
CREAT SERPL-MCNC: 0.84 MG/DL (ref 0.5–1.4)
EST. AVERAGE GLUCOSE BLD GHB EST-MCNC: 128 MG/DL
GFR SERPLBLD CREATININE-BSD FMLA CKD-EPI: 83 ML/MIN/1.73 M 2
GLOBULIN SER CALC-MCNC: 3.3 G/DL (ref 1.9–3.5)
GLUCOSE SERPL-MCNC: 96 MG/DL (ref 65–99)
HBA1C MFR BLD: 6.1 % (ref 4–5.6)
HBV SURFACE AB SERPL IA-ACNC: <3.5 MIU/ML (ref 0–10)
HCV AB SER QL: NORMAL
HDLC SERPL-MCNC: 62 MG/DL
HIV 1+2 AB+HIV1 P24 AG SERPL QL IA: NORMAL
LDLC SERPL CALC-MCNC: 108 MG/DL
POTASSIUM SERPL-SCNC: 4.5 MMOL/L (ref 3.6–5.5)
PROT SERPL-MCNC: 7.4 G/DL (ref 6–8.2)
SODIUM SERPL-SCNC: 136 MMOL/L (ref 135–145)
T4 FREE SERPL-MCNC: 1.15 NG/DL (ref 0.93–1.7)
TRIGL SERPL-MCNC: 139 MG/DL (ref 0–149)
TSH SERPL-ACNC: 0.98 UIU/ML (ref 0.35–5.5)
VIT B12 SERPL-MCNC: 449 PG/ML (ref 211–911)

## 2024-10-10 PROCEDURE — 36415 COLL VENOUS BLD VENIPUNCTURE: CPT

## 2024-10-10 PROCEDURE — 84681 ASSAY OF C-PEPTIDE: CPT

## 2024-10-10 PROCEDURE — 83525 ASSAY OF INSULIN: CPT

## 2024-10-10 PROCEDURE — 86235 NUCLEAR ANTIGEN ANTIBODY: CPT

## 2024-10-10 PROCEDURE — 83516 IMMUNOASSAY NONANTIBODY: CPT | Mod: 91

## 2024-10-10 PROCEDURE — 84439 ASSAY OF FREE THYROXINE: CPT

## 2024-10-10 PROCEDURE — 83036 HEMOGLOBIN GLYCOSYLATED A1C: CPT

## 2024-10-10 PROCEDURE — 80061 LIPID PANEL: CPT

## 2024-10-10 PROCEDURE — 82306 VITAMIN D 25 HYDROXY: CPT

## 2024-10-10 PROCEDURE — 84443 ASSAY THYROID STIM HORMONE: CPT

## 2024-10-10 PROCEDURE — 87389 HIV-1 AG W/HIV-1&-2 AB AG IA: CPT

## 2024-10-10 PROCEDURE — 80053 COMPREHEN METABOLIC PANEL: CPT

## 2024-10-10 PROCEDURE — 86341 ISLET CELL ANTIBODY: CPT

## 2024-10-10 PROCEDURE — 82607 VITAMIN B-12: CPT

## 2024-10-10 PROCEDURE — 86706 HEP B SURFACE ANTIBODY: CPT

## 2024-10-10 PROCEDURE — 86803 HEPATITIS C AB TEST: CPT

## 2024-10-12 LAB
C PEPTIDE SERPL-MCNC: 1.7 NG/ML (ref 0.5–3.3)
INSULIN P FAST SERPL-ACNC: 8 UIU/ML (ref 3–25)

## 2024-10-13 LAB
CENTROMERE IGG TITR SER IF: 0 AU/ML (ref 0–40)
ENA JO1 AB TITR SER: 1 AU/ML (ref 0–40)
ENA SCL70 IGG SER QL: 94 AU/ML (ref 0–40)
ENA SM IGG SER-ACNC: 2 AU/ML (ref 0–40)
ENA SS-A 60KD AB SER-ACNC: 0 AU/ML (ref 0–40)
ENA SS-A IGG SER QL: 1 AU/ML (ref 0–40)
ENA SS-B IGG SER IA-ACNC: 0 AU/ML (ref 0–40)
RIBOSOMAL P AB SER-ACNC: 0 AU/ML (ref 0–40)
U1 SNRNP IGG SER QL: 2 UNITS (ref 0–19)

## 2024-10-15 LAB — GAD65 AB SER IA-ACNC: <5 IU/ML (ref 0–5)

## 2025-01-07 ENCOUNTER — APPOINTMENT (OUTPATIENT)
Dept: MEDICAL GROUP | Facility: MEDICAL CENTER | Age: 55
End: 2025-01-07
Payer: COMMERCIAL

## 2025-01-16 ENCOUNTER — OFFICE VISIT (OUTPATIENT)
Dept: MEDICAL GROUP | Facility: MEDICAL CENTER | Age: 55
End: 2025-01-16
Payer: COMMERCIAL

## 2025-01-16 VITALS
DIASTOLIC BLOOD PRESSURE: 70 MMHG | WEIGHT: 158.07 LBS | TEMPERATURE: 97.8 F | SYSTOLIC BLOOD PRESSURE: 104 MMHG | OXYGEN SATURATION: 98 % | BODY MASS INDEX: 28.01 KG/M2 | HEART RATE: 63 BPM | HEIGHT: 63 IN

## 2025-01-16 DIAGNOSIS — I44.0 FIRST DEGREE AV BLOCK: ICD-10-CM

## 2025-01-16 DIAGNOSIS — Z12.11 SCREENING FOR COLORECTAL CANCER: ICD-10-CM

## 2025-01-16 DIAGNOSIS — R06.83 SNORING: ICD-10-CM

## 2025-01-16 DIAGNOSIS — R53.83 OTHER FATIGUE: ICD-10-CM

## 2025-01-16 DIAGNOSIS — K59.00 CONSTIPATION, UNSPECIFIED CONSTIPATION TYPE: ICD-10-CM

## 2025-01-16 DIAGNOSIS — Z12.12 SCREENING FOR COLORECTAL CANCER: ICD-10-CM

## 2025-01-16 DIAGNOSIS — F43.9 STRESS: ICD-10-CM

## 2025-01-16 DIAGNOSIS — F41.1 GAD (GENERALIZED ANXIETY DISORDER): ICD-10-CM

## 2025-01-16 PROCEDURE — 99214 OFFICE O/P EST MOD 30 MIN: CPT | Performed by: FAMILY MEDICINE

## 2025-01-16 PROCEDURE — 3078F DIAST BP <80 MM HG: CPT | Performed by: FAMILY MEDICINE

## 2025-01-16 PROCEDURE — 3074F SYST BP LT 130 MM HG: CPT | Performed by: FAMILY MEDICINE

## 2025-01-16 ASSESSMENT — FIBROSIS 4 INDEX: FIB4 SCORE: 0.71

## 2025-01-16 ASSESSMENT — ENCOUNTER SYMPTOMS
FEVER: 0
NAUSEA: 1
CONSTIPATION: 1
CHILLS: 0

## 2025-01-16 ASSESSMENT — PATIENT HEALTH QUESTIONNAIRE - PHQ9
SUM OF ALL RESPONSES TO PHQ QUESTIONS 1-9: 16
5. POOR APPETITE OR OVEREATING: 3 - NEARLY EVERY DAY
CLINICAL INTERPRETATION OF PHQ2 SCORE: 2

## 2025-01-16 NOTE — PROGRESS NOTES
Verbal consent was acquired by the patient to use "[employer] ambient listening note generation during this visit.      Joy was seen today for follow-up.    Diagnoses and all orders for this visit:    Snoring  -     Referral to Pulmonary and Sleep Medicine    Other fatigue  -     Referral to Pulmonary and Sleep Medicine    Screening for colorectal cancer    Stress  -     Patient has been identified as having a positive depression screening. Appropriate orders and counseling have been given.    ABI (generalized anxiety disorder)    First degree AV block    Constipation, unspecified constipation type                  Assessment & Plan  1. Anxiety:  Chronic condition, unstable  - Anxiety may be attributed to personal stressors, particularly issues with her son  - Reports waking up early, unable to return to sleep, itchiness, heart racing, and nausea  -Discussed coping skills.  -Will hold onto any medications currently, monitor symptoms closely.    2.  Sleep problems, snoring, fatigue symptoms  New condition, unstable  - Symptoms could indicate sleep apnea  - Concerns about sleep apnea due to snoring and feeling unrefreshed upon waking  - Previous referral to [facility], they recommended also sleep study  -Referral to sleep medicine for sleep study.      3.  First-degree AV block  - EKG in 2022 revealed first-degree AV block, repeat testing if symptomatic    4.  Constipation  New condition, unstable, this is likely causing nausea symptoms also.  Recommended to take MiraLAX or Metamucil or stool softener like Dulcolax.    PROCEDURE  The patient underwent a colonoscopy approximately 3 to 4 years ago at [facility], which yielded normal results.  Request records.      Follow-up after doing sleep study    Chief complaint::Diagnoses of Snoring, Other fatigue, Screening for colorectal cancer, Stress, ABI (generalized anxiety disorder), First degree AV block, and Constipation, unspecified constipation type were  "pertinent to this visit.      History of Present Illness  The patient is a 54-year-old female who presents for evaluation of anxiety symptoms.    Sleep Issues  - Early morning awakenings around 4:00 or 6:00 AM, followed by an inability to return to sleep  - Snoring and has not undergone a sleep study  - Informed that her tonsils are enlarged, but a tonsillectomy was not recommended  - Owns a pulse oximeter and has noticed a decrease in her oxygen saturation levels during sleep  - Upon awakening, feels unrefreshed and reluctant to rise  - Persistent skin itchiness  - Episodes of tachycardia  - Concern about potential cardiac issues, as these were not evaluated during her last blood test  -Has a stress at home and feeling anxious.  - Constant nausea  - Underwent a colonoscopy approximately 3 to 4 years ago at [facility], which yielded normal results  - Experiencing constipation    - Occasional feelings of anxiety, attributed to stressors in her life, including issues with her son        Review of Systems   Constitutional:  Positive for malaise/fatigue. Negative for chills and fever.   Gastrointestinal:  Positive for constipation and nausea.   Psychiatric/Behavioral:          Anxiety and stress          Medications and Allergies:     Current Outpatient Medications   Medication Sig Dispense Refill    estradiol (CLIMARA) 0.1 MG/24HR PATCH WEEKLY 1 PATCH TO SKIN TRANSDERMAL ONCE WEEKLY 90 DAYS       No current facility-administered medications for this visit.       /70 (BP Location: Left arm, Patient Position: Sitting, BP Cuff Size: Adult)   Pulse 63   Temp 36.6 °C (97.8 °F) (Temporal)   Ht 1.6 m (5' 3\")   Wt 71.7 kg (158 lb 1.1 oz)   SpO2 98% , Body mass index is 28 kg/m².      Physical Exam  Constitutional:       Appearance: Normal appearance. She is well-developed and well-groomed.   HENT:      Head: Normocephalic and atraumatic.      Right Ear: External ear normal.      Left Ear: External ear normal. "   Eyes:      General:         Right eye: No discharge.         Left eye: No discharge.      Conjunctiva/sclera: Conjunctivae normal.   Cardiovascular:      Rate and Rhythm: Normal rate.   Pulmonary:      Effort: Pulmonary effort is normal. No respiratory distress.   Musculoskeletal:      Cervical back: Neck supple.   Skin:     Findings: No rash.   Neurological:      Mental Status: She is alert.   Psychiatric:         Mood and Affect: Mood and affect normal.         Behavior: Behavior normal.                Please note that this dictation was created using voice recognition software. I have made every reasonable attempt to correct obvious errors, but I expect that there are errors of grammar and possibly content that I did not discover before finalizing the note.

## 2025-01-24 ENCOUNTER — TELEPHONE (OUTPATIENT)
Dept: MEDICAL GROUP | Facility: MEDICAL CENTER | Age: 55
End: 2025-01-24
Payer: COMMERCIAL

## 2025-01-24 DIAGNOSIS — M35.9 AUTOIMMUNE DISEASE (HCC): ICD-10-CM

## 2025-01-24 DIAGNOSIS — R53.83 OTHER FATIGUE: ICD-10-CM

## 2025-01-24 DIAGNOSIS — M25.50 POLYARTHRALGIA: ICD-10-CM

## 2025-01-25 NOTE — TELEPHONE ENCOUNTER
VOICEMAIL  1. Caller Name: Joy Staples   Call Back Number: [phone] (home)       2. Message: Pt left a voice message asking for a referral for an arthritis consult.

## 2025-01-28 ENCOUNTER — TELEPHONE (OUTPATIENT)
Dept: MEDICAL GROUP | Facility: MEDICAL CENTER | Age: 55
End: 2025-01-28
Payer: COMMERCIAL

## 2025-01-28 NOTE — TELEPHONE ENCOUNTER
1. Name: Joy Staples      Call Back Number: [phone] (home)         How would the patient prefer to be contacted with a response: Phone call OK to leave a detailed message    Pt Joy called in asking me why Magaly wasn't on the phone. I asked how we can help her. She said I should know when we called her. She said she just wanted to talk to Magaly. Advised she was busy with pt. She said she will wait on hold.     Thank you

## 2025-01-31 ENCOUNTER — PATIENT MESSAGE (OUTPATIENT)
Dept: MEDICAL GROUP | Facility: MEDICAL CENTER | Age: 55
End: 2025-01-31
Payer: COMMERCIAL

## 2025-01-31 DIAGNOSIS — M25.50 POLYARTHRALGIA: ICD-10-CM

## 2025-01-31 DIAGNOSIS — M35.9 AUTOIMMUNE DISEASE (HCC): ICD-10-CM

## 2025-02-12 NOTE — Clinical Note
REFERRAL APPROVAL NOTICE         Sent on February 12, 2025                   Joy Staples  [address]  [address]  [place] [address]                   Dear Ms. Staples,    After a careful review of the medical information and benefit coverage, [facility] has processed your referral. See below for additional details.    If applicable, you must be actively enrolled with your insurance for coverage of the authorized service. If you have any questions regarding your coverage, please contact your insurance directly.    REFERRAL INFORMATION   Referral #:  [id number]  Referred-To Provider    Referred-By Provider:  Rheumatology    Checo Scott M.D.   [facility]      [address]  [address]  [place] [address]  [phone] [id number]  [place] [address]  [phone]    Referral Start Date:  02/12/2025  Referral End Date:   02/12/2026             SCHEDULING  If you do not already have an appointment, please call [phone] to make an appointment.     MORE INFORMATION  If you do not already have a [employer] account, sign up at: [email or web address]  You can access your medical information, make appointments, see lab results, billing information, and more.  If you have questions regarding this referral, please contact  the [facility] Referrals department at:             [phone]. Monday - Friday 8:00AM - 5:00PM.     Sincerely,    [facility]

## 2025-04-07 ENCOUNTER — HOSPITAL ENCOUNTER (OUTPATIENT)
Dept: LAB | Facility: MEDICAL CENTER | Age: 55
End: 2025-04-07
Attending: NURSE PRACTITIONER
Payer: COMMERCIAL

## 2025-04-07 LAB
ALBUMIN SERPL BCP-MCNC: 4 G/DL (ref 3.2–4.9)
ALBUMIN/GLOB SERPL: 1.2 G/DL
ALP SERPL-CCNC: 67 U/L (ref 30–99)
ALT SERPL-CCNC: 14 U/L (ref 2–50)
ANION GAP SERPL CALC-SCNC: 10 MMOL/L (ref 7–16)
APPEARANCE UR: CLEAR
AST SERPL-CCNC: 17 U/L (ref 12–45)
BASOPHILS # BLD AUTO: 0.8 % (ref 0–1.8)
BASOPHILS # BLD: 0.06 K/UL (ref 0–0.12)
BILIRUB SERPL-MCNC: 0.5 MG/DL (ref 0.1–1.5)
BILIRUB UR QL STRIP.AUTO: NEGATIVE
BUN SERPL-MCNC: 17 MG/DL (ref 8–22)
CALCIUM ALBUM COR SERPL-MCNC: 8.9 MG/DL (ref 8.5–10.5)
CALCIUM SERPL-MCNC: 8.9 MG/DL (ref 8.5–10.5)
CHLORIDE SERPL-SCNC: 103 MMOL/L (ref 96–112)
CO2 SERPL-SCNC: 25 MMOL/L (ref 20–33)
COLOR UR: YELLOW
CREAT SERPL-MCNC: 0.71 MG/DL (ref 0.5–1.4)
CRP SERPL HS-MCNC: <0.3 MG/DL (ref 0–0.75)
DHEA-S SERPL-MCNC: 43.2 UG/DL (ref 18.9–205)
EOSINOPHIL # BLD AUTO: 0.24 K/UL (ref 0–0.51)
EOSINOPHIL NFR BLD: 3.3 % (ref 0–6.9)
ERYTHROCYTE [DISTWIDTH] IN BLOOD BY AUTOMATED COUNT: 43 FL (ref 35.9–50)
ERYTHROCYTE [SEDIMENTATION RATE] IN BLOOD BY WESTERGREN METHOD: 16 MM/HOUR (ref 0–25)
FERRITIN SERPL-MCNC: 80.3 NG/ML (ref 10–291)
FOLATE SERPL-MCNC: 12.4 NG/ML
GFR SERPLBLD CREATININE-BSD FMLA CKD-EPI: 101 ML/MIN/1.73 M 2
GLOBULIN SER CALC-MCNC: 3.3 G/DL (ref 1.9–3.5)
GLUCOSE SERPL-MCNC: 96 MG/DL (ref 65–99)
GLUCOSE UR STRIP.AUTO-MCNC: NEGATIVE MG/DL
HCT VFR BLD AUTO: 41.7 % (ref 37–47)
HGB BLD-MCNC: 13.9 G/DL (ref 12–16)
IMM GRANULOCYTES # BLD AUTO: 0.02 K/UL (ref 0–0.11)
IMM GRANULOCYTES NFR BLD AUTO: 0.3 % (ref 0–0.9)
KETONES UR STRIP.AUTO-MCNC: NEGATIVE MG/DL
LEUKOCYTE ESTERASE UR QL STRIP.AUTO: NEGATIVE
LYMPHOCYTES # BLD AUTO: 2.3 K/UL (ref 1–4.8)
LYMPHOCYTES NFR BLD: 31.3 % (ref 22–41)
MCH RBC QN AUTO: 31.6 PG (ref 27–33)
MCHC RBC AUTO-ENTMCNC: 33.3 G/DL (ref 32.2–35.5)
MCV RBC AUTO: 94.8 FL (ref 81.4–97.8)
MICRO URNS: NORMAL
MONOCYTES # BLD AUTO: 0.64 K/UL (ref 0–0.85)
MONOCYTES NFR BLD AUTO: 8.7 % (ref 0–13.4)
NEUTROPHILS # BLD AUTO: 4.09 K/UL (ref 1.82–7.42)
NEUTROPHILS NFR BLD: 55.6 % (ref 44–72)
NITRITE UR QL STRIP.AUTO: NEGATIVE
NRBC # BLD AUTO: 0 K/UL
NRBC BLD-RTO: 0 /100 WBC (ref 0–0.2)
PH UR STRIP.AUTO: 7 [PH] (ref 5–8)
PLATELET # BLD AUTO: 367 K/UL (ref 164–446)
PMV BLD AUTO: 10.1 FL (ref 9–12.9)
POTASSIUM SERPL-SCNC: 4.3 MMOL/L (ref 3.6–5.5)
PROLACTIN SERPL-MCNC: 6.9 NG/ML (ref 2.8–26)
PROT SERPL-MCNC: 7.3 G/DL (ref 6–8.2)
PROT UR QL STRIP: NEGATIVE MG/DL
RBC # BLD AUTO: 4.4 M/UL (ref 4.2–5.4)
RBC UR QL AUTO: NEGATIVE
SODIUM SERPL-SCNC: 138 MMOL/L (ref 135–145)
SP GR UR STRIP.AUTO: 1.02
T PALLIDUM AB SER QL IA: NORMAL
TESTOST SERPL-MCNC: 4 NG/DL (ref 9–75)
UROBILINOGEN UR STRIP.AUTO-MCNC: 1 EU/DL
VIT B12 SERPL-MCNC: 395 PG/ML (ref 211–911)
WBC # BLD AUTO: 7.4 K/UL (ref 4.8–10.8)

## 2025-04-07 PROCEDURE — 82607 VITAMIN B-12: CPT

## 2025-04-07 PROCEDURE — 82728 ASSAY OF FERRITIN: CPT

## 2025-04-07 PROCEDURE — 84630 ASSAY OF ZINC: CPT

## 2025-04-07 PROCEDURE — 36415 COLL VENOUS BLD VENIPUNCTURE: CPT

## 2025-04-07 PROCEDURE — 82570 ASSAY OF URINE CREATININE: CPT

## 2025-04-07 PROCEDURE — 85025 COMPLETE CBC W/AUTO DIFF WBC: CPT

## 2025-04-07 PROCEDURE — 86780 TREPONEMA PALLIDUM: CPT

## 2025-04-07 PROCEDURE — 81003 URINALYSIS AUTO W/O SCOPE: CPT

## 2025-04-07 PROCEDURE — 82746 ASSAY OF FOLIC ACID SERUM: CPT

## 2025-04-07 PROCEDURE — 84156 ASSAY OF PROTEIN URINE: CPT

## 2025-04-07 PROCEDURE — 86235 NUCLEAR ANTIGEN ANTIBODY: CPT | Mod: 91

## 2025-04-07 PROCEDURE — 82627 DEHYDROEPIANDROSTERONE: CPT

## 2025-04-07 PROCEDURE — 84146 ASSAY OF PROLACTIN: CPT

## 2025-04-07 PROCEDURE — 83516 IMMUNOASSAY NONANTIBODY: CPT

## 2025-04-07 PROCEDURE — 86038 ANTINUCLEAR ANTIBODIES: CPT | Mod: 91

## 2025-04-07 PROCEDURE — 84403 ASSAY OF TOTAL TESTOSTERONE: CPT

## 2025-04-07 PROCEDURE — 85652 RBC SED RATE AUTOMATED: CPT

## 2025-04-07 PROCEDURE — 80053 COMPREHEN METABOLIC PANEL: CPT

## 2025-04-07 PROCEDURE — 86140 C-REACTIVE PROTEIN: CPT

## 2025-04-07 PROCEDURE — 86256 FLUORESCENT ANTIBODY TITER: CPT | Mod: 91

## 2025-04-07 PROCEDURE — 84402 ASSAY OF FREE TESTOSTERONE: CPT

## 2025-04-08 LAB
CREAT UR-MCNC: 100 MG/DL
NUCLEAR IGG SER QL IA: NORMAL
PROT UR-MCNC: 7.1 MG/DL (ref 0–15)
PROT/CREAT UR: 71 MG/G (ref 10–107)

## 2025-04-09 LAB — TEST NAME 95000: NORMAL

## 2025-04-10 LAB
TESTOST FREE SERPL-MCNC: 1.1 PG/ML (ref 0.6–3.8)
ZINC SERPL-MCNC: 69.1 UG/DL (ref 60–120)

## 2025-05-01 LAB — TEST NAME 95000: NORMAL

## (undated) DEVICE — SUCTION INSTRUMENT YANKAUER BULBOUS TIP W/O VENT (50EA/CA)

## (undated) DEVICE — STAPLER 45MM ARTICULATING - ENDO (3EA/BX)

## (undated) DEVICE — LEAD 6 SET DISP. GE MARQUETTE (100EA/CA)

## (undated) DEVICE — SET EXTENSION WITH 2 PORTS (48EA/CA) ***PART #2C8610 IS A SUBSTITUTE*****

## (undated) DEVICE — TROCAR Z THREAD12MM OPTICAL - NON BLADED (6/BX)

## (undated) DEVICE — BAG RETRIEVAL 10ML (10EA/BX)

## (undated) DEVICE — GLOVE BIOGEL INDICATOR SZ 7SURGICAL PF LTX - (50/BX 4BX/CA)

## (undated) DEVICE — STAPLE 45MM BLUE 4.5MM (12EA/BX)

## (undated) DEVICE — ELECTRODE DUAL RETURN W/ CORD - (50/PK)

## (undated) DEVICE — LACTATED RINGERS INJ 1000 ML - (14EA/CA 60CA/PF)

## (undated) DEVICE — ELECTRODE 850 FOAM ADHESIVE - HYDROGEL RADIOTRNSPRNT (50/PK)

## (undated) DEVICE — HEAD HOLDER JUNIOR/ADULT

## (undated) DEVICE — SODIUM CHL IRRIGATION 0.9% 1000ML (12EA/CA)

## (undated) DEVICE — SUTURE 4-0 MONOCRYL PLUS PS-2 - 27 INCH (36/BX)

## (undated) DEVICE — SUTURE 0 PDS CT-2 (36PK/BX)

## (undated) DEVICE — KIT ANESTHESIA W/CIRCUIT & 3/LT BAG W/FILTER (20EA/CA)

## (undated) DEVICE — TOWEL STOP TIMEOUT SAFETY FLAG (40EA/CA)

## (undated) DEVICE — PROTECTOR ULNA NERVE - (36PR/CA)

## (undated) DEVICE — CANNULA W/SEAL 5X100 Z-THRE - ADED KII (12/BX)

## (undated) DEVICE — DRAPESURG STERI-DRAPE LONG - (10/BX 4BX/CA)

## (undated) DEVICE — GLOVE BIOGEL SZ 7 SURGICAL PF LTX - (50PR/BX 4BX/CA)

## (undated) DEVICE — DETERGENT RENUZYME PLUS 10 OZ PACKET (50/BX)

## (undated) DEVICE — CANISTER SUCTION 3000ML MECHANICAL FILTER AUTO SHUTOFF MEDI-VAC NONSTERILE LF DISP  (40EA/CA)

## (undated) DEVICE — SENSOR SPO2 NEO LNCS ADHESIVE (20/BX) SEE USER NOTES

## (undated) DEVICE — Device

## (undated) DEVICE — GOWN WARMING STANDARD FLEX - (30/CA)

## (undated) DEVICE — STAPLE 45MM VASCULAR WHITE 2.5MM (12EA/BX)

## (undated) DEVICE — MASK ANESTHESIA ADULT  - (100/CA)

## (undated) DEVICE — TROCAR 5X100 NON BLADED Z-TH - READ KII (6/BX)

## (undated) DEVICE — NEPTUNE 4 PORT MANIFOLD - (20/PK)

## (undated) DEVICE — SLEEVE, VASO, THIGH, MED

## (undated) DEVICE — TUBING CLEARLINK DUO-VENT - C-FLO (48EA/CA)

## (undated) DEVICE — SUTURE GENERAL

## (undated) DEVICE — CHLORAPREP 26 ML APPLICATOR - ORANGE TINT(25/CA)

## (undated) DEVICE — DERMABOND ADVANCED - (12EA/BX)